# Patient Record
Sex: FEMALE | Race: WHITE | Employment: OTHER
[De-identification: names, ages, dates, MRNs, and addresses within clinical notes are randomized per-mention and may not be internally consistent; named-entity substitution may affect disease eponyms.]

---

## 2017-01-09 ENCOUNTER — TELEPHONE (OUTPATIENT)
Dept: CASE MANAGEMENT | Age: 77
End: 2017-01-09

## 2017-01-09 ENCOUNTER — OFFICE VISIT (OUTPATIENT)
Dept: FAMILY MEDICINE CLINIC | Age: 77
End: 2017-01-09

## 2017-01-09 VITALS
OXYGEN SATURATION: 97 % | HEIGHT: 67 IN | RESPIRATION RATE: 16 BRPM | BODY MASS INDEX: 24.96 KG/M2 | HEART RATE: 76 BPM | SYSTOLIC BLOOD PRESSURE: 140 MMHG | TEMPERATURE: 97.9 F | WEIGHT: 159 LBS | DIASTOLIC BLOOD PRESSURE: 84 MMHG

## 2017-01-09 DIAGNOSIS — R91.1 PULMONARY NODULE: ICD-10-CM

## 2017-01-09 DIAGNOSIS — J44.9 COPD, SEVERITY TO BE DETERMINED (HCC): Primary | ICD-10-CM

## 2017-01-09 DIAGNOSIS — I50.22 CHRONIC SYSTOLIC CONGESTIVE HEART FAILURE (HCC): ICD-10-CM

## 2017-01-09 DIAGNOSIS — I34.2 NON-RHEUMATIC MITRAL VALVE STENOSIS: ICD-10-CM

## 2017-01-09 PROCEDURE — 99214 OFFICE O/P EST MOD 30 MIN: CPT | Performed by: FAMILY MEDICINE

## 2017-01-10 ENCOUNTER — TELEPHONE (OUTPATIENT)
Dept: PULMONOLOGY | Age: 77
End: 2017-01-10

## 2017-01-16 ENCOUNTER — TELEPHONE (OUTPATIENT)
Dept: PULMONOLOGY | Age: 77
End: 2017-01-16

## 2017-01-16 DIAGNOSIS — J44.9 COPD, SEVERITY TO BE DETERMINED (HCC): Primary | ICD-10-CM

## 2017-01-18 ENCOUNTER — HOSPITAL ENCOUNTER (OUTPATIENT)
Dept: PULMONOLOGY | Age: 77
Discharge: HOME | End: 2017-01-18
Attending: INTERNAL MEDICINE | Admitting: INTERNAL MEDICINE

## 2017-01-18 DIAGNOSIS — J44.9 CHRONIC OBSTRUCTIVE PULMONARY DISEASE (HCC): ICD-10-CM

## 2017-01-18 LAB
DLCO %PRED: NORMAL
DLCO PRE: NORMAL
FEF 25-75%-POST: NORMAL
FEF 25-75%-PRE: NORMAL
FEV1-POST: NORMAL
FEV1-PRE: NORMAL
FEV1/FVC-POST: NORMAL
FEV1/FVC-PRE: NORMAL
FVC-POST: NORMAL
FVC-PRE: NORMAL
MEP: NORMAL
MIP: NORMAL
MVV %PRED-PRE: NORMAL
MVV-PRE: NORMAL
TLC %PRED: NORMAL
TLC PRE: NORMAL

## 2017-01-18 PROCEDURE — 94727 GAS DIL/WSHOT DETER LNG VOL: CPT | Performed by: INTERNAL MEDICINE

## 2017-01-18 PROCEDURE — 94060 EVALUATION OF WHEEZING: CPT | Performed by: INTERNAL MEDICINE

## 2017-01-18 RX ORDER — ALBUTEROL SULFATE 90 UG/1
4 AEROSOL, METERED RESPIRATORY (INHALATION) ONCE
Status: COMPLETED | OUTPATIENT
Start: 2017-01-18 | End: 2017-01-18

## 2017-01-18 RX ADMIN — ALBUTEROL SULFATE 4 PUFF: 90 AEROSOL, METERED RESPIRATORY (INHALATION) at 11:07

## 2017-01-19 ENCOUNTER — OFFICE VISIT (OUTPATIENT)
Dept: PULMONOLOGY | Age: 77
End: 2017-01-19

## 2017-01-19 VITALS
HEART RATE: 78 BPM | HEIGHT: 66 IN | OXYGEN SATURATION: 98 % | SYSTOLIC BLOOD PRESSURE: 158 MMHG | TEMPERATURE: 98.1 F | RESPIRATION RATE: 16 BRPM | WEIGHT: 160 LBS | DIASTOLIC BLOOD PRESSURE: 80 MMHG | BODY MASS INDEX: 25.71 KG/M2

## 2017-01-19 DIAGNOSIS — Z71.6 TOBACCO ABUSE COUNSELING: ICD-10-CM

## 2017-01-19 DIAGNOSIS — R91.1 PULMONARY NODULE: Primary | ICD-10-CM

## 2017-01-19 PROCEDURE — 99215 OFFICE O/P EST HI 40 MIN: CPT | Performed by: INTERNAL MEDICINE

## 2017-01-27 ENCOUNTER — CARE COORDINATION (OUTPATIENT)
Dept: INTERNAL MEDICINE CLINIC | Age: 77
End: 2017-01-27

## 2017-02-03 ENCOUNTER — TELEPHONE (OUTPATIENT)
Dept: FAMILY MEDICINE CLINIC | Age: 77
End: 2017-02-03

## 2017-02-03 RX ORDER — LISINOPRIL 2.5 MG/1
2.5 TABLET ORAL DAILY
Qty: 90 TABLET | Refills: 1 | Status: SHIPPED | OUTPATIENT
Start: 2017-02-03 | End: 2017-03-08 | Stop reason: SDUPTHER

## 2017-03-09 RX ORDER — LISINOPRIL 2.5 MG/1
2.5 TABLET ORAL DAILY
Qty: 90 TABLET | Refills: 1 | Status: SHIPPED | OUTPATIENT
Start: 2017-03-09 | End: 2017-08-08 | Stop reason: ALTCHOICE

## 2017-04-27 ENCOUNTER — TELEPHONE (OUTPATIENT)
Dept: OTHER | Facility: CLINIC | Age: 77
End: 2017-04-27

## 2017-05-03 ENCOUNTER — TELEPHONE (OUTPATIENT)
Dept: FAMILY MEDICINE CLINIC | Age: 77
End: 2017-05-03

## 2017-05-03 RX ORDER — CARVEDILOL 6.25 MG/1
6.25 TABLET ORAL 2 TIMES DAILY WITH MEALS
Qty: 60 TABLET | Refills: 3 | Status: SHIPPED | OUTPATIENT
Start: 2017-05-03 | End: 2017-05-17 | Stop reason: DRUGHIGH

## 2017-05-12 RX ORDER — PRAVASTATIN SODIUM 40 MG
40 TABLET ORAL DAILY
Qty: 90 TABLET | Refills: 1 | Status: SHIPPED | OUTPATIENT
Start: 2017-05-12 | End: 2018-05-12

## 2017-05-17 ENCOUNTER — OFFICE VISIT (OUTPATIENT)
Dept: FAMILY MEDICINE CLINIC | Age: 77
End: 2017-05-17

## 2017-05-17 VITALS
DIASTOLIC BLOOD PRESSURE: 80 MMHG | HEART RATE: 78 BPM | WEIGHT: 165 LBS | RESPIRATION RATE: 16 BRPM | BODY MASS INDEX: 26.52 KG/M2 | TEMPERATURE: 98.5 F | HEIGHT: 66 IN | SYSTOLIC BLOOD PRESSURE: 130 MMHG

## 2017-05-17 DIAGNOSIS — Z00.00 MEDICARE ANNUAL WELLNESS VISIT, SUBSEQUENT: Primary | ICD-10-CM

## 2017-05-17 DIAGNOSIS — F32.5 MAJOR DEPRESSION, SINGLE EPISODE, IN COMPLETE REMISSION (HCC): ICD-10-CM

## 2017-05-17 DIAGNOSIS — J44.9 CHRONIC OBSTRUCTIVE PULMONARY DISEASE, UNSPECIFIED COPD TYPE (HCC): ICD-10-CM

## 2017-05-17 DIAGNOSIS — I34.2 NON-RHEUMATIC MITRAL VALVE STENOSIS: ICD-10-CM

## 2017-05-17 DIAGNOSIS — Z72.0 TOBACCO ABUSE: ICD-10-CM

## 2017-05-17 DIAGNOSIS — E78.5 HYPERLIPIDEMIA WITH TARGET LDL LESS THAN 130: ICD-10-CM

## 2017-05-17 DIAGNOSIS — I50.22 CHRONIC SYSTOLIC CONGESTIVE HEART FAILURE (HCC): ICD-10-CM

## 2017-05-17 DIAGNOSIS — F32.5 MAJOR DEPRESSIVE DISORDER WITH SINGLE EPISODE, IN FULL REMISSION (HCC): ICD-10-CM

## 2017-05-17 PROCEDURE — G0439 PPPS, SUBSEQ VISIT: HCPCS | Performed by: FAMILY MEDICINE

## 2017-05-17 PROCEDURE — 99214 OFFICE O/P EST MOD 30 MIN: CPT | Performed by: FAMILY MEDICINE

## 2017-05-17 RX ORDER — CARVEDILOL 6.25 MG/1
6.25 TABLET ORAL NIGHTLY
Qty: 60 TABLET | Refills: 3 | Status: SHIPPED | COMMUNITY
Start: 2017-05-17 | End: 2017-12-20 | Stop reason: SDUPTHER

## 2017-05-17 ASSESSMENT — ANXIETY QUESTIONNAIRES: GAD7 TOTAL SCORE: 1

## 2017-05-17 ASSESSMENT — LIFESTYLE VARIABLES: HOW OFTEN DO YOU HAVE A DRINK CONTAINING ALCOHOL: 0

## 2017-05-17 ASSESSMENT — PATIENT HEALTH QUESTIONNAIRE - PHQ9: SUM OF ALL RESPONSES TO PHQ QUESTIONS 1-9: 2

## 2017-08-08 ENCOUNTER — OFFICE VISIT (OUTPATIENT)
Dept: PULMONOLOGY | Age: 77
End: 2017-08-08

## 2017-08-08 ENCOUNTER — HOSPITAL ENCOUNTER (OUTPATIENT)
Dept: CT IMAGING | Age: 77
Discharge: HOME | End: 2017-08-08
Admitting: INTERNAL MEDICINE

## 2017-08-08 VITALS
OXYGEN SATURATION: 95 % | WEIGHT: 166 LBS | TEMPERATURE: 98 F | DIASTOLIC BLOOD PRESSURE: 88 MMHG | BODY MASS INDEX: 26.68 KG/M2 | SYSTOLIC BLOOD PRESSURE: 160 MMHG | HEART RATE: 74 BPM | RESPIRATION RATE: 16 BRPM | HEIGHT: 66 IN

## 2017-08-08 DIAGNOSIS — R91.1 SOLITARY PULMONARY NODULE: ICD-10-CM

## 2017-08-08 DIAGNOSIS — Z71.6 TOBACCO ABUSE COUNSELING: ICD-10-CM

## 2017-08-08 DIAGNOSIS — R91.1 PULMONARY NODULE: ICD-10-CM

## 2017-08-08 DIAGNOSIS — R91.1 PULMONARY NODULE: Primary | ICD-10-CM

## 2017-08-08 PROCEDURE — 99213 OFFICE O/P EST LOW 20 MIN: CPT | Performed by: INTERNAL MEDICINE

## 2017-09-01 DIAGNOSIS — F32.5 MAJOR DEPRESSIVE DISORDER WITH SINGLE EPISODE, IN FULL REMISSION (HCC): ICD-10-CM

## 2017-09-01 RX ORDER — SERTRALINE HYDROCHLORIDE 100 MG/1
TABLET, FILM COATED ORAL
Qty: 90 TABLET | Refills: 0 | Status: SHIPPED | OUTPATIENT
Start: 2017-09-01 | End: 2017-12-09 | Stop reason: SDUPTHER

## 2017-12-09 DIAGNOSIS — F32.5 MAJOR DEPRESSIVE DISORDER WITH SINGLE EPISODE, IN FULL REMISSION (HCC): ICD-10-CM

## 2017-12-11 RX ORDER — SERTRALINE HYDROCHLORIDE 100 MG/1
TABLET, FILM COATED ORAL
Qty: 90 TABLET | Refills: 0 | Status: SHIPPED | OUTPATIENT
Start: 2017-12-11 | End: 2018-03-31 | Stop reason: SDUPTHER

## 2017-12-20 RX ORDER — CARVEDILOL 6.25 MG/1
6.25 TABLET ORAL NIGHTLY
Qty: 60 TABLET | Refills: 3 | Status: SHIPPED | OUTPATIENT
Start: 2017-12-20 | End: 2018-01-10 | Stop reason: SDUPTHER

## 2018-01-10 ENCOUNTER — OFFICE VISIT (OUTPATIENT)
Dept: FAMILY MEDICINE CLINIC | Age: 78
End: 2018-01-10

## 2018-01-10 VITALS
SYSTOLIC BLOOD PRESSURE: 138 MMHG | DIASTOLIC BLOOD PRESSURE: 80 MMHG | RESPIRATION RATE: 16 BRPM | WEIGHT: 164 LBS | HEART RATE: 80 BPM | TEMPERATURE: 98 F | BODY MASS INDEX: 26.36 KG/M2 | HEIGHT: 66 IN

## 2018-01-10 DIAGNOSIS — J44.9 CHRONIC OBSTRUCTIVE PULMONARY DISEASE, UNSPECIFIED COPD TYPE (HCC): ICD-10-CM

## 2018-01-10 DIAGNOSIS — S76.212A GROIN STRAIN, LEFT, INITIAL ENCOUNTER: ICD-10-CM

## 2018-01-10 DIAGNOSIS — I34.2 NON-RHEUMATIC MITRAL VALVE STENOSIS: ICD-10-CM

## 2018-01-10 DIAGNOSIS — I25.10 CORONARY ARTERY DISEASE INVOLVING NATIVE CORONARY ARTERY OF NATIVE HEART WITHOUT ANGINA PECTORIS: Primary | ICD-10-CM

## 2018-01-10 DIAGNOSIS — Z72.0 TOBACCO ABUSE: ICD-10-CM

## 2018-01-10 DIAGNOSIS — E78.5 HYPERLIPIDEMIA WITH TARGET LDL LESS THAN 130: ICD-10-CM

## 2018-01-10 DIAGNOSIS — S46.012A ROTATOR CUFF STRAIN, LEFT, INITIAL ENCOUNTER: ICD-10-CM

## 2018-01-10 DIAGNOSIS — I50.22 CHRONIC SYSTOLIC CONGESTIVE HEART FAILURE (HCC): ICD-10-CM

## 2018-01-10 DIAGNOSIS — F32.5 MAJOR DEPRESSIVE DISORDER WITH SINGLE EPISODE, IN FULL REMISSION (HCC): ICD-10-CM

## 2018-01-10 DIAGNOSIS — Z23 NEEDS FLU SHOT: ICD-10-CM

## 2018-01-10 PROCEDURE — 1090F PRES/ABSN URINE INCON ASSESS: CPT | Performed by: FAMILY MEDICINE

## 2018-01-10 PROCEDURE — 3023F SPIROM DOC REV: CPT | Performed by: FAMILY MEDICINE

## 2018-01-10 PROCEDURE — 99214 OFFICE O/P EST MOD 30 MIN: CPT | Performed by: FAMILY MEDICINE

## 2018-01-10 PROCEDURE — 4040F PNEUMOC VAC/ADMIN/RCVD: CPT | Performed by: FAMILY MEDICINE

## 2018-01-10 PROCEDURE — G0008 ADMIN INFLUENZA VIRUS VAC: HCPCS | Performed by: FAMILY MEDICINE

## 2018-01-10 PROCEDURE — 4004F PT TOBACCO SCREEN RCVD TLK: CPT | Performed by: FAMILY MEDICINE

## 2018-01-10 PROCEDURE — G8598 ASA/ANTIPLAT THER USED: HCPCS | Performed by: FAMILY MEDICINE

## 2018-01-10 PROCEDURE — G8926 SPIRO NO PERF OR DOC: HCPCS | Performed by: FAMILY MEDICINE

## 2018-01-10 PROCEDURE — 1123F ACP DISCUSS/DSCN MKR DOCD: CPT | Performed by: FAMILY MEDICINE

## 2018-01-10 PROCEDURE — G8417 CALC BMI ABV UP PARAM F/U: HCPCS | Performed by: FAMILY MEDICINE

## 2018-01-10 PROCEDURE — G8427 DOCREV CUR MEDS BY ELIG CLIN: HCPCS | Performed by: FAMILY MEDICINE

## 2018-01-10 PROCEDURE — 90662 IIV NO PRSV INCREASED AG IM: CPT | Performed by: FAMILY MEDICINE

## 2018-01-10 PROCEDURE — G8484 FLU IMMUNIZE NO ADMIN: HCPCS | Performed by: FAMILY MEDICINE

## 2018-01-10 PROCEDURE — G8399 PT W/DXA RESULTS DOCUMENT: HCPCS | Performed by: FAMILY MEDICINE

## 2018-01-10 RX ORDER — CARVEDILOL 6.25 MG/1
6.25 TABLET ORAL NIGHTLY
Qty: 60 TABLET | Refills: 3 | Status: SHIPPED | OUTPATIENT
Start: 2018-01-10 | End: 2018-06-15 | Stop reason: SDUPTHER

## 2018-01-10 NOTE — PROGRESS NOTES
CHART REVIEW  Health Maintenance   Topic Date Due    DTaP/Tdap/Td vaccine (1 - Tdap) 08/04/2010    Flu vaccine (1) 09/01/2017    Lipid screen  12/19/2021    Zostavax vaccine  Addressed    DEXA (modify frequency per FRAX score)  Completed    Pneumococcal low/med risk  Completed      Patient Active Problem List   Diagnosis    Hyperlipidemia with target LDL less than 130    Tobacco abuse    Vertigo, mild    Posttraumatic stress disorder    Screening mammogram, encounter for    GERD (gastroesophageal reflux disease)    Colon cancer screening    Sialadenitis- recurrent    Needs flu shot    Menopausal state    Major depressive disorder with single episode, in full remission ([facility] [address]    Pulmonary nodule 8mm, neg PET 2016, repeat CT 6/2017    Chronic obstructive pulmonary disease ([facility] [address]    History of MI (myocardial infarction)    Hiatal hernia    Non-rheumatic mitral valve stenosis- mild-mod    Chronic systolic congestive heart failure ([facility] [address]- EF 30-35% 12/2016    Coronary artery disease involving native coronary artery without angina pectoris- cath 12/16     Current Outpatient Prescriptions   Medication Sig Dispense Refill    carvedilol (COREG) 6.25 MG tablet Take 1 tablet by mouth nightly 60 tablet 3    sertraline (ZOLOFT) 100 MG tablet TAKE ONE TABLET BY MOUTH DAILY 90 tablet 0    pravastatin (PRAVACHOL) 40 MG tablet Take 1 tablet by mouth daily 90 tablet 1    aspirin EC 81 MG EC tablet Take 81 mg by mouth daily.  famotidine (PEPCID) 20 MG tablet Take 20 mg by mouth 2 times daily.  acetaminophen (TYLENOL ARTHRITIS PAIN) 650 MG CR tablet Take 650 mg by mouth every 8 hours as needed. No current facility-administered medications for this visit.        Cholesterol, Total   Date Value Ref Range Status   12/19/2016 161 0 - 199 mg/dL Final     LDL Calculated   Date Value Ref Range Status   12/19/2016 72 <100 mg/dL Final     HDL   Date Value Ref Range Status   12/19/2016 70 (H) 40 - 60 mg/dL Final   2011 53 40 - 60 mg/dl Final     Triglycerides   Date Value Ref Range Status   2016 93 0 - 150 mg/dL Final     Lab Results   Component Value Date    GLUCOSE 86 2016     Lab Results   Component Value Date     2016    K 3.9 2016    CREATININE 1.2 2016     Lab Results   Component Value Date    WBC 8.6 2016    HGB 12.2 2016    HCT 38.1 2016    MCV 94.1 2016     2016     Lab Results   Component Value Date    ALT 15 2016    AST 19 2016    ALKPHOS 61 2016    BILITOT 0.6 2016     TSH (uIU/mL)   Date Value   2015 1.49     No results found for: LABA1C  The 10-year ASCVD risk score (Steve Salas., et al., 2013) is: 30.6%    Values used to calculate the score:      Age: 68 years      Sex: Female      Is Non- : No      Diabetic: No      Tobacco smoker: Yes      Systolic Blood Pressure: 190 mmHg      Is BP treated: No      HDL Cholesterol: 70 mg/dL      Total Cholesterol: 161 mg/dL  VISIT NOTE   Subjective:   HPI CHRONIC:   Chief Complaint   Patient presents with    Check-Up     6 month     Arm Pain     Left     Patient here for follow-up of multiple chronic conditions includin. Coronary artery disease involving native coronary artery of native heart without angina pectoris    2. Needs flu shot    3. Hyperlipidemia with target LDL less than 130    4. Tobacco abuse    5. Major depressive disorder with single episode, in full remission ([address]    6. Non-rheumatic mitral valve stenosis- mild-mod    7. Chronic systolic congestive heart failure ([address]- EF 30-35% 2016    8.  Chronic obstructive pulmonary disease, unspecified COPD type ([address]      Left upper arm pain with abduction of arm x 2 months, not changing  OTC aleve some help     L inner upper thigh pain, mostly with walking, OK at rest, aleve some help, intermittent, not daily, x 2 months    Low back ache with prolonged stand, no radicular symptoms or incontinence    · Following appropriate diet? Yes  · Exercise:  intermittently  · Taking medicines daily as directed? Yes  · Any side effects of medications? No    ROS:  General ROS: negative for - chills, fatigue or fever  Hematological and Lymphatic ROS: negative for - blood clots or weight loss  Respiratory ROS: no cough, shortness of breath, or wheezing  Cardiovascular ROS: no chest pain or dyspnea on exertion  Gastrointestinal ROS: no abdominal pain, change in bowel habits, or black or bloody stools  Genito-Urinary ROS: no dysuria, trouble voiding, or hematuria  Neurological ROS: no TIA or stroke symptoms    HISTORY:  Patient's medications, allergies, past medical, surgical, social and family histories were reviewed and updated as appropriate (See above). Objective:   PHYSICAL EXAM   /80 (Site: Right Arm, Position: Sitting, Cuff Size: Medium Adult)   Pulse 80   Temp 98 °F (36.7 °C) (Oral)   Resp 16   Ht 5' 6\" (1.676 m)   Wt 164 lb (74.4 kg)   BMI 26.47 kg/m²   Blood pressure is Good. BP Readings from Last 5 Encounters:   01/10/18 138/80   08/08/17 (!) 160/88   05/17/17 130/80   01/19/17 (!) 158/80   01/09/17 140/84     Weight is decreased. Wt Readings from Last 5 Encounters:   01/10/18 164 lb (74.4 kg)   08/08/17 166 lb (75.3 kg)   05/17/17 165 lb (74.8 kg)   01/19/17 160 lb (72.6 kg)   01/09/17 159 lb (72.1 kg)      GENERAL:   · well-developed, well-nourished, alert, no distress.      EYES: glasses  · External findings: lids and lashes normal and conjunctivae and sclerae normal  LUNGS:    · Breathing unlabored  · clear to auscultation bilaterally and good air movement  · Palpation: Normal  CARDIOVASC:   · regular rate and rhythm, S1, S2 normal. No murmur, click, rub or gallop  · Apical impulse normal  · LEGS:  Lower extremity edema: none    SKIN: warm and dry  PSYCH:    · Alert and oriented  · Normal reasoning, insight good  · Facial expressions full, mood

## 2018-01-10 NOTE — PATIENT INSTRUCTIONS
your back with your knees bent and your feet flat on the floor. Tighten your stomach muscles and flatten your back against the floor. Tuck your chin to your chest. With your hands stretched out in front of you, curl your upper body forward until your shoulders clear the floor. Hold this position for 3 seconds. Don't hold your breath. It helps to breathe out as you lift your shoulders up. Relax. Repeat 10 times. Build to 3 sets of 10. To challenge yourself, clasp your hands behind your head and keep your elbows out to the side. Lower trunk rotation: Lie on your back with your knees bent and your feet flat on the floor. Tighten your abdominal muscles and push your lower back into the floor. Keeping your shoulders down flat, gently rotate your legs to one side, then the other as far as you can. Repeat 10 to 20 times. Single knee to chest stretch: Lie on your back with your legs straight out in front of you. Bring one knee up to your chest and grasp the back of your thigh. Pull your knee toward your chest, stretching your buttock muscle. Hold this position for 15 to 30 seconds and return to the starting position. Repeat 3 times on each side. Double knee to chest: Lie on your back with your knees bent and your feet flat on the floor. Tighten your abdominal muscles and push your lower back into the floor. Pull both knees up to your chest. Hold for 5 seconds and repeat 10 to 20 times. How can I take care of myself? In addition to the treatment described above, keep in mind these suggestions:   Use an electric heating pad on a low setting (or a hot water bottle wrapped in a towel to avoid burning yourself) for 20 to 30 minutes. Don't let the heating pad get too hot, and don't fall asleep with it. You could get a burn. Try putting an ice pack wrapped in a towel on your back for 20 minutes, one to four times a day. Set an alarm to avoid frostbite from using the ice pack too long.    Put a pillow under your knees in a doorway with your elbow bent 90° and the front of your hand pressing against the door frame, attempt to press your palm into the door frame. Hold for 5 seconds. Do 3 sets of 10. Tubing exercise for external rotation: Stand resting the hand of your injured side against your stomach. With that hand grasp tubing that is connected to a doorknob or other object at waist level. Keeping your elbow in at your side, rotate your arm outward and away from your waist. Make sure you keep your elbow bent 90 degrees and your forearm parallel to the floor. Repeat 10 times. Build up to 3 sets of 10. Supraspinatus exercise: Standing with your arms at your sides and your thumbs pointed toward the floor. Lift your arms up and out from your sides, keeping your elbows straight. Lift your hands only to shoulder level. Hold 5 seconds. Do 3 sets of 10. Gradually add weight to your hands to increase your strength.

## 2018-03-31 DIAGNOSIS — F32.5 MAJOR DEPRESSIVE DISORDER WITH SINGLE EPISODE, IN FULL REMISSION (HCC): ICD-10-CM

## 2018-04-02 RX ORDER — SERTRALINE HYDROCHLORIDE 100 MG/1
TABLET, FILM COATED ORAL
Qty: 90 TABLET | Refills: 1 | Status: SHIPPED | OUTPATIENT
Start: 2018-04-02 | End: 2019-03-22

## 2018-05-08 DIAGNOSIS — E78.5 HYPERLIPIDEMIA WITH TARGET LDL LESS THAN 130: ICD-10-CM

## 2018-05-08 DIAGNOSIS — I25.10 CORONARY ARTERY DISEASE INVOLVING NATIVE CORONARY ARTERY OF NATIVE HEART WITHOUT ANGINA PECTORIS: ICD-10-CM

## 2018-05-08 LAB
A/G RATIO: 1.8 (ref 1.1–2.2)
ALBUMIN SERPL-MCNC: 4.2 G/DL (ref 3.4–5)
ALP BLD-CCNC: 62 U/L (ref 40–129)
ALT SERPL-CCNC: 10 U/L (ref 10–40)
ANION GAP SERPL CALCULATED.3IONS-SCNC: 16 MMOL/L (ref 3–16)
AST SERPL-CCNC: 17 U/L (ref 15–37)
BILIRUB SERPL-MCNC: 0.4 MG/DL (ref 0–1)
BUN BLDV-MCNC: 16 MG/DL (ref 7–20)
CALCIUM SERPL-MCNC: 9 MG/DL (ref 8.3–10.6)
CHLORIDE BLD-SCNC: 103 MMOL/L (ref 99–110)
CHOLESTEROL, TOTAL: 285 MG/DL (ref 0–199)
CO2: 25 MMOL/L (ref 21–32)
CREAT SERPL-MCNC: 1.1 MG/DL (ref 0.6–1.2)
GFR AFRICAN AMERICAN: 58
GFR NON-AFRICAN AMERICAN: 48
GLOBULIN: 2.3 G/DL
GLUCOSE BLD-MCNC: 91 MG/DL (ref 70–99)
HDLC SERPL-MCNC: 58 MG/DL (ref 40–60)
LDL CHOLESTEROL CALCULATED: 193 MG/DL
POTASSIUM SERPL-SCNC: 4.8 MMOL/L (ref 3.5–5.1)
SODIUM BLD-SCNC: 144 MMOL/L (ref 136–145)
TOTAL PROTEIN: 6.5 G/DL (ref 6.4–8.2)
TRIGL SERPL-MCNC: 170 MG/DL (ref 0–150)
VLDLC SERPL CALC-MCNC: 34 MG/DL

## 2018-05-12 RX ORDER — ATORVASTATIN CALCIUM 80 MG/1
80 TABLET, FILM COATED ORAL NIGHTLY
Qty: 30 TABLET | Refills: 5 | Status: SHIPPED | OUTPATIENT
Start: 2018-05-12 | End: 2018-09-05

## 2018-05-15 ENCOUNTER — HOSPITAL ENCOUNTER (OUTPATIENT)
Dept: OTHER | Age: 78
Discharge: HOME | End: 2018-05-15
Attending: FAMILY MEDICINE | Admitting: FAMILY MEDICINE

## 2018-05-15 ENCOUNTER — OFFICE VISIT (OUTPATIENT)
Dept: FAMILY MEDICINE CLINIC | Age: 78
End: 2018-05-15

## 2018-05-15 VITALS
HEIGHT: 66 IN | HEART RATE: 80 BPM | BODY MASS INDEX: 26.9 KG/M2 | RESPIRATION RATE: 16 BRPM | WEIGHT: 167.4 LBS | TEMPERATURE: 97.6 F | SYSTOLIC BLOOD PRESSURE: 142 MMHG | OXYGEN SATURATION: 98 % | DIASTOLIC BLOOD PRESSURE: 86 MMHG

## 2018-05-15 DIAGNOSIS — G89.29 CHRONIC LEFT-SIDED LOW BACK PAIN WITH LEFT-SIDED SCIATICA: Primary | ICD-10-CM

## 2018-05-15 DIAGNOSIS — G89.29 CHRONIC LEFT-SIDED LOW BACK PAIN WITH LEFT-SIDED SCIATICA: ICD-10-CM

## 2018-05-15 DIAGNOSIS — M54.42 CHRONIC LEFT-SIDED LOW BACK PAIN WITH LEFT-SIDED SCIATICA: ICD-10-CM

## 2018-05-15 DIAGNOSIS — M71.22 POPLITEAL CYST, LEFT: ICD-10-CM

## 2018-05-15 DIAGNOSIS — R10.32 LEFT GROIN PAIN: ICD-10-CM

## 2018-05-15 DIAGNOSIS — M54.42 CHRONIC LEFT-SIDED LOW BACK PAIN WITH LEFT-SIDED SCIATICA: Primary | ICD-10-CM

## 2018-05-15 DIAGNOSIS — Z91.81 AT HIGH RISK FOR FALLS: ICD-10-CM

## 2018-05-15 PROCEDURE — G8417 CALC BMI ABV UP PARAM F/U: HCPCS | Performed by: FAMILY MEDICINE

## 2018-05-15 PROCEDURE — 4040F PNEUMOC VAC/ADMIN/RCVD: CPT | Performed by: FAMILY MEDICINE

## 2018-05-15 PROCEDURE — 1123F ACP DISCUSS/DSCN MKR DOCD: CPT | Performed by: FAMILY MEDICINE

## 2018-05-15 PROCEDURE — 99213 OFFICE O/P EST LOW 20 MIN: CPT | Performed by: FAMILY MEDICINE

## 2018-05-15 PROCEDURE — 1090F PRES/ABSN URINE INCON ASSESS: CPT | Performed by: FAMILY MEDICINE

## 2018-05-15 PROCEDURE — 4004F PT TOBACCO SCREEN RCVD TLK: CPT | Performed by: FAMILY MEDICINE

## 2018-05-15 PROCEDURE — G8399 PT W/DXA RESULTS DOCUMENT: HCPCS | Performed by: FAMILY MEDICINE

## 2018-05-15 PROCEDURE — G8598 ASA/ANTIPLAT THER USED: HCPCS | Performed by: FAMILY MEDICINE

## 2018-05-15 PROCEDURE — G8427 DOCREV CUR MEDS BY ELIG CLIN: HCPCS | Performed by: FAMILY MEDICINE

## 2018-05-15 RX ORDER — PREDNISONE 10 MG/1
TABLET ORAL
Qty: 39 TABLET | Refills: 0 | Status: SHIPPED | OUTPATIENT
Start: 2018-05-15 | End: 2018-05-27

## 2018-05-16 ENCOUNTER — TELEPHONE (OUTPATIENT)
Dept: FAMILY MEDICINE CLINIC | Age: 78
End: 2018-05-16

## 2018-05-17 ENCOUNTER — TELEPHONE (OUTPATIENT)
Dept: FAMILY MEDICINE CLINIC | Age: 78
End: 2018-05-17

## 2018-06-15 ENCOUNTER — OFFICE VISIT (OUTPATIENT)
Dept: FAMILY MEDICINE CLINIC | Age: 78
End: 2018-06-15

## 2018-06-15 VITALS
HEIGHT: 66 IN | BODY MASS INDEX: 27.32 KG/M2 | SYSTOLIC BLOOD PRESSURE: 170 MMHG | RESPIRATION RATE: 14 BRPM | WEIGHT: 170 LBS | OXYGEN SATURATION: 97 % | DIASTOLIC BLOOD PRESSURE: 94 MMHG | HEART RATE: 86 BPM

## 2018-06-15 DIAGNOSIS — I50.22 CHRONIC SYSTOLIC CONGESTIVE HEART FAILURE (HCC): ICD-10-CM

## 2018-06-15 DIAGNOSIS — Z72.0 TOBACCO ABUSE: ICD-10-CM

## 2018-06-15 DIAGNOSIS — I10 ESSENTIAL HYPERTENSION: Primary | ICD-10-CM

## 2018-06-15 PROCEDURE — 4004F PT TOBACCO SCREEN RCVD TLK: CPT | Performed by: FAMILY MEDICINE

## 2018-06-15 PROCEDURE — G8417 CALC BMI ABV UP PARAM F/U: HCPCS | Performed by: FAMILY MEDICINE

## 2018-06-15 PROCEDURE — G8399 PT W/DXA RESULTS DOCUMENT: HCPCS | Performed by: FAMILY MEDICINE

## 2018-06-15 PROCEDURE — G8598 ASA/ANTIPLAT THER USED: HCPCS | Performed by: FAMILY MEDICINE

## 2018-06-15 PROCEDURE — 1090F PRES/ABSN URINE INCON ASSESS: CPT | Performed by: FAMILY MEDICINE

## 2018-06-15 PROCEDURE — 4040F PNEUMOC VAC/ADMIN/RCVD: CPT | Performed by: FAMILY MEDICINE

## 2018-06-15 PROCEDURE — 3288F FALL RISK ASSESSMENT DOCD: CPT | Performed by: FAMILY MEDICINE

## 2018-06-15 PROCEDURE — 1123F ACP DISCUSS/DSCN MKR DOCD: CPT | Performed by: FAMILY MEDICINE

## 2018-06-15 PROCEDURE — 99214 OFFICE O/P EST MOD 30 MIN: CPT | Performed by: FAMILY MEDICINE

## 2018-06-15 PROCEDURE — G8427 DOCREV CUR MEDS BY ELIG CLIN: HCPCS | Performed by: FAMILY MEDICINE

## 2018-06-15 PROCEDURE — G8510 SCR DEP NEG, NO PLAN REQD: HCPCS | Performed by: FAMILY MEDICINE

## 2018-06-15 RX ORDER — COVID-19 ANTIGEN TEST
KIT MISCELLANEOUS
COMMUNITY
End: 2020-09-29

## 2018-06-15 RX ORDER — CARVEDILOL 6.25 MG/1
6.25 TABLET ORAL 2 TIMES DAILY
Qty: 60 TABLET | Refills: 3 | Status: SHIPPED | OUTPATIENT
Start: 2018-06-15 | End: 2018-06-29 | Stop reason: SDUPTHER

## 2018-06-15 ASSESSMENT — PATIENT HEALTH QUESTIONNAIRE - PHQ9
SUM OF ALL RESPONSES TO PHQ QUESTIONS 1-9: 0
SUM OF ALL RESPONSES TO PHQ9 QUESTIONS 1 & 2: 0
1. LITTLE INTEREST OR PLEASURE IN DOING THINGS: 0
2. FEELING DOWN, DEPRESSED OR HOPELESS: 0

## 2018-06-29 DIAGNOSIS — I50.22 CHRONIC SYSTOLIC CONGESTIVE HEART FAILURE (HCC): ICD-10-CM

## 2018-06-29 DIAGNOSIS — I10 ESSENTIAL HYPERTENSION: ICD-10-CM

## 2018-06-29 RX ORDER — CARVEDILOL 6.25 MG/1
TABLET ORAL
Qty: 180 TABLET | Refills: 2 | Status: SHIPPED | OUTPATIENT
Start: 2018-06-29 | End: 2018-09-05 | Stop reason: SDUPTHER

## 2018-08-27 ENCOUNTER — TELEPHONE (OUTPATIENT)
Dept: PULMONOLOGY | Age: 78
End: 2018-08-27

## 2018-08-27 DIAGNOSIS — R91.1 PULMONARY NODULE SEEN ON IMAGING STUDY: Primary | ICD-10-CM

## 2018-08-27 NOTE — TELEPHONE ENCOUNTER
Patient said that she is calling regarding her yearly orders that she usually does the same day as appointment. She is currently scheduled 10/25/18.   I checked the last office note and only mentions CT of chest.

## 2018-09-05 ENCOUNTER — OFFICE VISIT (OUTPATIENT)
Dept: FAMILY MEDICINE CLINIC | Age: 78
End: 2018-09-05

## 2018-09-05 VITALS
HEART RATE: 72 BPM | BODY MASS INDEX: 26.84 KG/M2 | WEIGHT: 167 LBS | RESPIRATION RATE: 16 BRPM | HEIGHT: 66 IN | SYSTOLIC BLOOD PRESSURE: 138 MMHG | OXYGEN SATURATION: 99 % | TEMPERATURE: 97.3 F | DIASTOLIC BLOOD PRESSURE: 80 MMHG

## 2018-09-05 DIAGNOSIS — Z23 NEED FOR VACCINATION FOR ZOSTER: ICD-10-CM

## 2018-09-05 DIAGNOSIS — R42 VERTIGO: ICD-10-CM

## 2018-09-05 DIAGNOSIS — Z00.00 MEDICARE ANNUAL WELLNESS VISIT, SUBSEQUENT: Primary | ICD-10-CM

## 2018-09-05 DIAGNOSIS — R19.7 DIARRHEA, UNSPECIFIED TYPE: ICD-10-CM

## 2018-09-05 DIAGNOSIS — J44.9 CHRONIC OBSTRUCTIVE PULMONARY DISEASE, UNSPECIFIED COPD TYPE (HCC): ICD-10-CM

## 2018-09-05 DIAGNOSIS — E78.5 HYPERLIPIDEMIA WITH TARGET LDL LESS THAN 100: ICD-10-CM

## 2018-09-05 DIAGNOSIS — I50.22 CHRONIC SYSTOLIC CONGESTIVE HEART FAILURE (HCC): ICD-10-CM

## 2018-09-05 DIAGNOSIS — F32.5 MAJOR DEPRESSIVE DISORDER WITH SINGLE EPISODE, IN FULL REMISSION (HCC): ICD-10-CM

## 2018-09-05 DIAGNOSIS — Z72.0 TOBACCO ABUSE: ICD-10-CM

## 2018-09-05 DIAGNOSIS — R07.9 CHEST PAIN, UNSPECIFIED TYPE: ICD-10-CM

## 2018-09-05 DIAGNOSIS — I25.10 CORONARY ARTERY DISEASE INVOLVING NATIVE CORONARY ARTERY OF NATIVE HEART WITHOUT ANGINA PECTORIS: ICD-10-CM

## 2018-09-05 DIAGNOSIS — I10 ESSENTIAL HYPERTENSION: ICD-10-CM

## 2018-09-05 PROCEDURE — 93000 ELECTROCARDIOGRAM COMPLETE: CPT | Performed by: FAMILY MEDICINE

## 2018-09-05 PROCEDURE — 4040F PNEUMOC VAC/ADMIN/RCVD: CPT | Performed by: FAMILY MEDICINE

## 2018-09-05 PROCEDURE — 99214 OFFICE O/P EST MOD 30 MIN: CPT | Performed by: FAMILY MEDICINE

## 2018-09-05 PROCEDURE — 1090F PRES/ABSN URINE INCON ASSESS: CPT | Performed by: FAMILY MEDICINE

## 2018-09-05 PROCEDURE — G8598 ASA/ANTIPLAT THER USED: HCPCS | Performed by: FAMILY MEDICINE

## 2018-09-05 PROCEDURE — 1101F PT FALLS ASSESS-DOCD LE1/YR: CPT | Performed by: FAMILY MEDICINE

## 2018-09-05 PROCEDURE — 3023F SPIROM DOC REV: CPT | Performed by: FAMILY MEDICINE

## 2018-09-05 PROCEDURE — G0439 PPPS, SUBSEQ VISIT: HCPCS | Performed by: FAMILY MEDICINE

## 2018-09-05 PROCEDURE — G8399 PT W/DXA RESULTS DOCUMENT: HCPCS | Performed by: FAMILY MEDICINE

## 2018-09-05 PROCEDURE — 4004F PT TOBACCO SCREEN RCVD TLK: CPT | Performed by: FAMILY MEDICINE

## 2018-09-05 PROCEDURE — G8926 SPIRO NO PERF OR DOC: HCPCS | Performed by: FAMILY MEDICINE

## 2018-09-05 PROCEDURE — G8427 DOCREV CUR MEDS BY ELIG CLIN: HCPCS | Performed by: FAMILY MEDICINE

## 2018-09-05 PROCEDURE — 1123F ACP DISCUSS/DSCN MKR DOCD: CPT | Performed by: FAMILY MEDICINE

## 2018-09-05 PROCEDURE — G8417 CALC BMI ABV UP PARAM F/U: HCPCS | Performed by: FAMILY MEDICINE

## 2018-09-05 RX ORDER — PRAVASTATIN SODIUM 80 MG/1
80 TABLET ORAL DAILY
Qty: 30 TABLET | Refills: 3 | Status: SHIPPED | OUTPATIENT
Start: 2018-09-05 | End: 2019-03-22 | Stop reason: SDUPTHER

## 2018-09-05 RX ORDER — MECLIZINE HYDROCHLORIDE 25 MG/1
25 TABLET ORAL 3 TIMES DAILY PRN
Qty: 30 TABLET | Refills: 1 | Status: SHIPPED | OUTPATIENT
Start: 2018-09-05 | End: 2019-09-05

## 2018-09-05 RX ORDER — CARVEDILOL 12.5 MG/1
12.5 TABLET ORAL 2 TIMES DAILY WITH MEALS
Qty: 60 TABLET | Refills: 3 | Status: SHIPPED | OUTPATIENT
Start: 2018-09-05 | End: 2019-01-05 | Stop reason: SDUPTHER

## 2018-09-05 ASSESSMENT — ANXIETY QUESTIONNAIRES: GAD7 TOTAL SCORE: 2

## 2018-09-05 ASSESSMENT — PATIENT HEALTH QUESTIONNAIRE - PHQ9
SUM OF ALL RESPONSES TO PHQ QUESTIONS 1-9: 2
SUM OF ALL RESPONSES TO PHQ QUESTIONS 1-9: 2

## 2018-09-05 NOTE — PATIENT INSTRUCTIONS
drive you to the nearest hospital. Drive yourself only as a last resort. If you have risk factors for coronary artery disease  such as high blood pressure, high cholesterol, diabetes or obesity  talk to your doctor. He or she may want to test you for the condition, especially if you have signs or symptoms of narrowed arteries. Even if you don't have evidence of coronary artery disease, your doctor may recommend aggressive treatment of your risk factors. Early diagnosis and treatment may stop progression of coronary artery disease and help prevent a heart attack. Coronary artery disease is thought to begin with damage or injury to the inner layer of a coronary artery, sometimes as early as childhood. The damage may be caused by various factors, including:  Smoking   High blood pressure   High cholesterol   Diabetes   Radiation therapy to the chest, as used for certain types of cancer  Once the inner wall of an artery is damaged, fatty deposits (plaques) made of cholesterol and other cellular waste products tend to accumulate at the site of injury in a process called atherosclerosis. If the surface of these plaques breaks or ruptures, blood cells called platelets will clump at the site to try to repair the artery. This clump can block the artery, leading to a heart attack. Risk factors for coronary artery disease include:  Age. Simply getting older increases your risk of damaged and narrowed arteries. Sex. Men are generally at greater risk of coronary artery disease. However, the risk for women increases after menopause. Family history. A family history of heart disease is associated with a higher risk of coronary artery disease, especially if a close relative developed heart disease at an early age. Your risk is highest if your father or a brother was diagnosed with heart disease before age 54, or your mother or a sister developed it before age 72. Smoking.  Nicotine constricts your blood vessels, and quickly you receive treatment. Heart failure. If some areas of your heart are chronically deprived of oxygen and nutrients because of reduced blood flow, or if your heart has been damaged by a heart attack, your heart may become too weak to pump enough blood to meet your body's needs. This condition is known as heart failure. Abnormal heart rhythm (arrhythmia). Inadequate blood supply to the heart or damage to heart tissue can interfere with your heart's electrical impulses, causing abnormal heart rhythms. Early-stage coronary artery disease often produces no symptoms, so you may not discover you're at risk of the condition until a routine checkup reveals you have high cholesterol or high blood pressure. So it's important to have regular checkups. If you're seeing your doctor because you're having symptoms or you have risk factors for coronary artery disease, you're likely to start by first seeing your primary care doctor or a general practitioner. Eventually, however, you may be referred to a heart specialist (cardiologist). Because appointments can be brief, and because there's often a lot of ground to cover, it's a good idea to be prepared for your appointment. Here's some information to help you get ready for your appointment, and what to expect from your doctor. What you can do  Be aware of any pre-appointment restrictions. At the time you make the appointment, be sure to ask if there's anything you need to do in advance, such as restrict your diet. For a cholesterol test, for example, you may need to fast for a period of time beforehand. Write down any symptoms you're experiencing, including any that may seem unrelated to coronary artery disease. Write down your key medical information, including other conditions with which you've been diagnosed, all medications and supplements you're taking, and any family history of heart disease.    Find a family member or friend who can come with you to the appointment, if possible. Someone who accompanies you can help remember what the doctor says. Write down questions to ask your doctor. Questions to ask your doctor at your initial appointment include:   What are the possible causes for my signs and symptoms? What tests do I need? Should I see a specialist?   Should I follow any restrictions while I wait for my next appointment? What emergency signs and symptoms should prompt a call to 911 or emergency medical help? Questions to ask if you are referred to a cardiologist include:   What is my diagnosis? What is my risk of long-term complications from this condition? What treatment do you recommend? If you're recommending medications, what are the possible side effects? Am I a candidate for surgery? Why or why not? What diet and lifestyle changes should I make? What restrictions do I need to follow, if any? How frequently will you see me for follow-up visits? I have these other health problems. How can I best manage them together? In addition to the questions that you've prepared to ask your doctor, don't hesitate to ask questions during your appointment. What to expect from your doctor  A doctor or cardiologist who sees you for heart-related signs and symptoms may ask:  What are your symptoms? When did you first begin experiencing symptoms? Have your symptoms gotten worse over time? Do your symptoms include chest pain? Have you had any difficulty breathing? Does exercise or physical exertion make your symptoms worse? Are you aware of any history of heart problems in your family? Have you been diagnosed with any other health conditions? What medications are you currently taking? Have you ever been treated with radiation therapy? How much do you exercise in a typical week? What's your typical daily diet? Do you or did you smoke? How much? Do you drink alcohol? How much?   What you can do in the meantime  It's never too early to make healthy lifestyle changes, such as quitting smoking, eating healthy foods and becoming more physically active. These are primary lines of defense against coronary artery disease and its complications, including heart attack and stroke. The doctor will ask questions about your medical history, do a physical exam and order routine blood tests. He or she may suggest one or more diagnostic tests as well, including:  Electrocardiogram (ECG). An electrocardiogram records electrical signals as they travel through your heart. An ECG can often reveal evidence of a previous heart attack or one that's in progress. In other cases, Holter monitoring may be recommended. With this type of ECG, you wear a portable monitor for 24 hours as you go about your normal activities. Certain abnormalities may indicate inadequate blood flow to your heart. Echocardiogram. An echocardiogram uses sound waves to produce images of your heart. During an echocardiogram, your doctor can determine whether all parts of the heart wall are contributing normally to your heart's pumping activity. Parts that move weakly may have been damaged during a heart attack or be receiving too little oxygen. This may indicate coronary artery disease or various other conditions. Stress test. If your signs and symptoms occur most often during exercise, your doctor may ask you to walk on a treadmill or ride a stationary bike during an ECG. This is known as an exercise stress test. In some cases, medication to stimulate your heart may be used instead of exercise. Some stress tests are done using an echocardiogram. For example, your doctor may do an ultrasound before and after you exercise on a treadmill or bike. Or your doctor may use medication to stimulate your heart during an echocardiogram.   Another stress test known as a nuclear stress test helps measure blood flow to your heart muscle at rest and during stress.  It's similar to a routine heart health isn't as strong. Other supplements may help reduce your blood pressure or cholesterol level, two other contributing factors to coronary artery disease. These include:   Alpha-linolenic acid (ALA)   Artichoke   Barley   Beta-sitosterol (found in oral supplements and some margarines, such as Promise Activ)   Blond psyllium   Cocoa   Coenzyme N25   Garlic   Oat bran (found in oatmeal and whole oats)   Sitostanol (found in oral supplements and some margarines, such as Benecol)  Lifestyle changes can help you prevent or slow the progression of coronary artery disease. Stop smoking. Smoking is a major risk factor for coronary artery disease. Nicotine constricts blood vessels and forces your heart to work harder, and carbon monoxide reduces oxygen in your blood and damages the lining of your blood vessels. If you smoke, quitting is one of the best ways to reduce your risk of a heart attack. Control your blood pressure. Ask your doctor for a blood pressure measurement at least every two years. He or she may recommend more frequent measurements if your blood pressure is higher than normal or you have a history of heart disease. The ideal blood pressure is below 830 systolic and 80 diastolic, as measured in millimeters of mercury (mm Hg). Check your cholesterol. Ask your doctor for a baseline cholesterol test when you're in your 20s and then at least every five years. If your test results aren't within desirable ranges, your doctor may recommend more frequent measurements. Most people should aim for an LDL level below 130 milligrams per deciliter (mg/dL), or 3.4 millimoles per liter (mmol/L). If you have other risk factors for heart disease, your target LDL may be below 100 mg/dL (2.6 mmol/L). Keep diabetes under control. If you have diabetes, tight blood sugar control can help reduce the risk of heart disease. Get moving.  Exercise helps you achieve and maintain a healthy weight and control diabetes, maneuvers as described above can be done at home (Kita et al, 1999; Aye Davis and Betty, 2004). We often recommend the home-Epley to our patients who have a clear diagnosis. This procedure seems to be even more effective than the in-office procedure, perhaps because it is repeated every night for a week. The method (for the left side) is performed as shown on the figure to the right. One stays in each of the supine (lying down) positions for 30 seconds, and in the sitting upright position (top) for 1 minute. Thus, once cycle takes 2 1/2 minutes. Typically 3 cycles are performed just prior to going to sleep. It is best to do them at night rather than in the morning or midday, as if one becomes dizzy following the exercises, then it can resolve while one is sleeping. The mirror image of this procedure is used for the right ear. Personalized Preventive Plan for Sarah Luna - 9/5/2018  Medicare offers a range of preventive health benefits. Some of the tests and screenings are paid in full while other may be subject to a deductible, co-insurance, and/or copay. Some of these benefits include a comprehensive review of your medical history including lifestyle, illnesses that may run in your family, and various assessments and screenings as appropriate. After reviewing your medical record and screening and assessments performed today your provider may have ordered immunizations, labs, imaging, and/or referrals for you. A list of these orders (if applicable) as well as your Preventive Care list are included within your After Visit Summary for your review. Other Preventive Recommendations:    · A preventive eye exam performed by an eye specialist is recommended every 1-2 years to screen for glaucoma; cataracts, macular degeneration, and other eye disorders. · A preventive dental visit is recommended every 6 months.   · Try to get at least 150 minutes of exercise per week or 10,000 steps per day on a good.          FALLS:  HOW TO LOWER YOUR RISK     Who is at high risk of falling? Anyone can fall, although the risk is higher in older people. This increased risk of falling may be the result of changes that come with aging, and certain medical conditions, such as arthritis, cataracts or hip problems. What can I do to lower my risk of falling? Most falls happen in the home. Consider the following tips to make your home safe: Make sure that you have good lighting in your home. A well lit home will help you avoid tripping over objects that are not easy to see. Put night lights in your bedroom, hallways, stairs and bathrooms. Rugs should be firmly fastened to the floor or have nonskid backing. Loose ends should be tacked down. Electrical cords should not be lying on the floor in walking areas. Put hand rails in your bathroom for bath, shower and toilet use. Have rails on both sides of your stairs for support. In the kitchen, make sure items are within easy reach. Don't store things too high or too low. Then you won't have to use a stepladder or a stool to reach them. It's also a good idea to avoid storing things too low, so you won't have to bend down to get them. Wear shoes with firm nonskid soles. Avoid wearing loose-fitting slippers that could cause you to trip. What else can I do? Take good care of your body. Try to stay healthy by following these tips:  See your eye doctor once a year. Cataracts and other eye diseases that cause you not to see well, can lead to falls. Get regular physical activity to keep your bones and muscles strong. Take good care of your feet. If you have pain in your feet or if you have large, thick nails and corns, have your doctor look at your feet. Talk to your doctor about any side effects you may have from your medicines. Problems caused by side effects from medicine are a common cause of falls. The more medicines you take, the greater your risk of falling. Do the oven and fridge open easily? Are stove controls clearly marked and easy to use? Is the counter the right height and depth? Can you work sitting down? Are cabinet doorknobs easy to use? Are faucets easy to use? Do you have a hand-held shower head? Are the items you use often on high shelves? Do you have a step stool with handles? Can you easily get in and out of the tub or shower? Do you have a bath or shower seat? Are there grab bars where needed? Is the hot water heater regulated to prevent scalding or burning? Lighting/Ventilation YesNo    Are there enough lights, and are they bright enough? Do you have night lights where needed? Is area well ventilated? Electrical Outlets/Switches/Alarms YesNo    Can you turn switches easily on and off? Are outlets properly grounded to prevent a shock? Are extension cords in good shape? Do you have smoke detectors in all key areas? Do you have an alarm system? Is the telephone readily available for emergencies? Does the telephone have volume control? Can you hear the doorbell ring all throughout the house?

## 2018-09-05 NOTE — PROGRESS NOTES
Medicare Annual Wellness Visit  Name: Antonieta Leyden  YOB: 1940  Age: 68 y.o. Sex: female  MRN: [id number]     Date of Service:  9/5/2018    Scribe documentation: Anh Chua am scribing for Mckenzie Key MD. Electronically signed by Mckenzie Key  on 9/5/2018 at 10:14 AM.  MD Attestation: Corinne Anon,  personally performed the services described in this documentation, as scribed by the user listed above, and it is both accurate and complete. I agree with the Chief Complaint, ROS, and Past Histories independently gathered by the clinical support staff.       LAST LABS   Cholesterol, Total   Date Value Ref Range Status   05/08/2018 285 (H) 0 - 199 mg/dL Final     LDL Calculated   Date Value Ref Range Status   05/08/2018 193 (H) <100 mg/dL Final     HDL   Date Value Ref Range Status   05/08/2018 58 40 - 60 mg/dL Final   09/22/2011 53 40 - 60 mg/dl Final     Triglycerides   Date Value Ref Range Status   05/08/2018 170 (H) 0 - 150 mg/dL Final     Lab Results   Component Value Date    GLUCOSE 91 05/08/2018     Lab Results   Component Value Date     05/08/2018    K 4.8 05/08/2018    CREATININE 1.1 05/08/2018     Lab Results   Component Value Date    ALT 10 05/08/2018    AST 17 05/08/2018    ALKPHOS 62 05/08/2018    BILITOT 0.4 05/08/2018      Lab Results   Component Value Date    WBC 8.6 12/20/2016    HGB 12.2 12/20/2016    HCT 38.1 12/20/2016    MCV 94.1 12/20/2016     12/20/2016     TSH (uIU/mL)   Date Value   07/22/2015 1.49     No results found for: LABA1C  CHART REVIEW  Patient Active Problem List   Diagnosis    Hyperlipidemia with target LDL less than 100    Tobacco abuse    Vertigo, mild    Posttraumatic stress disorder    Screening mammogram, encounter for    GERD (gastroesophageal reflux disease)    Colon cancer screening    Sialadenitis- recurrent    Needs flu shot    Menopausal state    Major depressive disorder with single episode, in full remission ([facility] [address]    Pulmonary nodule 8mm, neg PET 2016, repeat CT per pulmonary    Chronic obstructive pulmonary disease ([facility] [address]    History of MI (myocardial infarction)    Hiatal hernia    Non-rheumatic mitral valve stenosis- mild-mod    Chronic systolic congestive heart failure ([facility] [address]- EF 30-35% 12/2016    Coronary artery disease involving native coronary artery without angina pectoris- cath 12/16    Essential hypertension     Health Maintenance   Topic Date Due    Shingles Vaccine (1 of 2 - 2 Dose Series) 12/16/1990    DTaP/Tdap/Td vaccine (1 - Tdap) 08/04/2010    Flu vaccine (1) 09/01/2018    Potassium monitoring  05/08/2019    Creatinine monitoring  05/08/2019    DEXA (modify frequency per FRAX score)  Completed    Pneumococcal low/med risk  Completed     Social History   Substance Use Topics    Smoking status: Current Every Day Smoker     Packs/day: 0.25     Years: 25.00     Types: Cigarettes    Smokeless tobacco: Never Used      Comment: advised to quit, reducing some    Alcohol use No      Comment: recovering alcoholic- sober since 74/8131     The 10-year ASCVD risk score (Robyn Vora et al., 2013) is: 38.8%    Values used to calculate the score:      Age: 68 years      Sex: Female      Is Non- : No      Diabetic: No      Tobacco smoker: Yes      Systolic Blood Pressure: 925 mmHg      Is BP treated: Yes      HDL Cholesterol: 58 mg/dL      Total Cholesterol: 285 mg/dL  Prior to Visit Medications    Medication Sig Taking?  Authorizing Provider   zoster recombinant adjuvanted vaccine (SHINGRIX) 50 MCG SUSR injection Inject 0.5 mLs into the muscle once for 1 dose Yes Jamison Lombardi MD   meclizine (ANTIVERT) 25 MG tablet Take 1 tablet by mouth 3 times daily as needed for Dizziness or Nausea Yes Jamison Lombardi MD   pravastatin (PRAVACHOL) 80 MG tablet Take 1 tablet by mouth daily Yes Jamison Lombardi MD   carvedilol (COREG) 12.5 MG tablet Take 1 tablet by mouth 2 times daily (with meals) Yes Reford Arm Adrian Clark MD   Naproxen Sodium (ALEVE) 220 MG CAPS Take by mouth Yes Historical Provider, MD   sertraline (ZOLOFT) 100 MG tablet TAKE ONE TABLET BY MOUTH DAILY Yes Juanita Rebollar MD   aspirin EC 81 MG EC tablet Take 81 mg by mouth daily. Yes Historical Provider, MD   famotidine (PEPCID) 20 MG tablet Take 20 mg by mouth 2 times daily. Yes Historical Provider, MD   acetaminophen (TYLENOL ARTHRITIS PAIN) 650 MG CR tablet Take 650 mg by mouth every 8 hours as needed. Yes Historical Provider, MD      Chief Complaint:   Jessi Azar is a 68 y.o. female who presents for Medicare Annual Wellness Visit and check-up for:  1. Medicare annual wellness visit, subsequent    2. Need for vaccination for zoster    3. Major depressive disorder with single episode, in full remission ([facility] [address] - fair control   4. Chronic obstructive pulmonary disease, unspecified COPD type ([facility] [address] good   5. Chronic systolic congestive heart failure ([facility] [address]- EF 30-35% 12/2016 - ? Synmptomatic, maris got ECHO ordered last year   6. Hyperlipidemia with target LDL less than 100 - intol of last statin   7. Tobacco abuse    8. Coronary artery disease involving native coronary artery of native heart without angina pectoris    9. Chest pain, unspecified type - NEW   10. Vertigo, mild - worsening     HPI  Complaints:   · Stopped taking atorvastatin 80mg  about a month ago due to making her feeling sick even with cutting pill on half. · Dizziness over the last year but the past few months is getting worse . Lasts a few seconds. Triggered by turning head or lay down and look up. .  · Diarrhea off and on over the past few months, every morning, multiple, no blood, watery, some incontinence, pos urgency, x few months. No recent travels, med or food changes. · Chest pain left chest pressure into left shoulder 3 weeks ago, at rest, no exacerbating/no relieving, last 10 min to several hours, daily. Cath 2016 60% LAD.   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 We will call you if any problems. If bloodwork good, you will get letter in mail or notified thru [address] (if signed up) within 2 weeks. If you do not, please call office. · Please get flu vaccine when available in fall. Can get either at this office or at stores such as [facility] and [facility]. · Get CT lung as planned  · Quit smoking. · Get heart stress test soon. · Schedule to see cardiology. · INCREASE carvediol to 12.5 mg twice a day   · Continue aspirin. · Do vertigo exercises. Take meclizine as needed. · Medicare part D patients:  Please take Rx for Shingrix to pharmacy (such as [facility] or "[employer] to get shingles vaccine. Need second dose in 2-6 months. · START Pravastatin one a day for cholesterol  · Get stool studies done. Will need colonoscopy after cardiac clearance.

## 2018-09-13 DIAGNOSIS — R07.9 CHEST PAIN, UNSPECIFIED TYPE: ICD-10-CM

## 2018-09-13 DIAGNOSIS — I50.22 CHRONIC SYSTOLIC CONGESTIVE HEART FAILURE (HCC): ICD-10-CM

## 2018-09-13 DIAGNOSIS — R19.7 DIARRHEA, UNSPECIFIED TYPE: ICD-10-CM

## 2018-09-13 DIAGNOSIS — E78.5 HYPERLIPIDEMIA WITH TARGET LDL LESS THAN 100: ICD-10-CM

## 2018-09-13 LAB
A/G RATIO: 2.4 (ref 1.1–2.2)
ALBUMIN SERPL-MCNC: 4.4 G/DL (ref 3.4–5)
ALP BLD-CCNC: 61 U/L (ref 40–129)
ALT SERPL-CCNC: 8 U/L (ref 10–40)
ANION GAP SERPL CALCULATED.3IONS-SCNC: 13 MMOL/L (ref 3–16)
AST SERPL-CCNC: 17 U/L (ref 15–37)
BASOPHILS ABSOLUTE: 0.1 K/UL (ref 0–0.2)
BASOPHILS RELATIVE PERCENT: 1 %
BILIRUB SERPL-MCNC: 0.4 MG/DL (ref 0–1)
BUN BLDV-MCNC: 15 MG/DL (ref 7–20)
CALCIUM SERPL-MCNC: 9.1 MG/DL (ref 8.3–10.6)
CHLORIDE BLD-SCNC: 104 MMOL/L (ref 99–110)
CHOLESTEROL, TOTAL: 264 MG/DL (ref 0–199)
CO2: 24 MMOL/L (ref 21–32)
CREAT SERPL-MCNC: 1.1 MG/DL (ref 0.6–1.2)
EOSINOPHILS ABSOLUTE: 0.2 K/UL (ref 0–0.6)
EOSINOPHILS RELATIVE PERCENT: 4 %
GFR AFRICAN AMERICAN: 58
GFR NON-AFRICAN AMERICAN: 48
GLOBULIN: 1.8 G/DL
GLUCOSE BLD-MCNC: 108 MG/DL (ref 70–99)
HCT VFR BLD CALC: 37.3 % (ref 36–48)
HDLC SERPL-MCNC: 51 MG/DL (ref 40–60)
HEMOGLOBIN: 12.3 G/DL (ref 12–16)
LDL CHOLESTEROL CALCULATED: 176 MG/DL
LYMPHOCYTES ABSOLUTE: 1.2 K/UL (ref 1–5.1)
LYMPHOCYTES RELATIVE PERCENT: 20.1 %
MCH RBC QN AUTO: 31.1 PG (ref 26–34)
MCHC RBC AUTO-ENTMCNC: 33.1 G/DL (ref 31–36)
MCV RBC AUTO: 94.1 FL (ref 80–100)
MONOCYTES ABSOLUTE: 0.6 K/UL (ref 0–1.3)
MONOCYTES RELATIVE PERCENT: 10.7 %
NEUTROPHILS ABSOLUTE: 3.7 K/UL (ref 1.7–7.7)
NEUTROPHILS RELATIVE PERCENT: 64.2 %
PDW BLD-RTO: 13.7 % (ref 12.4–15.4)
PLATELET # BLD: 174 K/UL (ref 135–450)
PMV BLD AUTO: 8.5 FL (ref 5–10.5)
POTASSIUM SERPL-SCNC: 4.7 MMOL/L (ref 3.5–5.1)
RBC # BLD: 3.96 M/UL (ref 4–5.2)
SODIUM BLD-SCNC: 141 MMOL/L (ref 136–145)
TOTAL PROTEIN: 6.2 G/DL (ref 6.4–8.2)
TRIGL SERPL-MCNC: 183 MG/DL (ref 0–150)
TSH SERPL DL<=0.05 MIU/L-ACNC: 1.68 UIU/ML (ref 0.27–4.2)
VLDLC SERPL CALC-MCNC: 37 MG/DL
WBC # BLD: 5.8 K/UL (ref 4–11)

## 2018-09-14 LAB
CRYPTOSPORIDIUM ANTIGEN STOOL: NORMAL
GI BACTERIAL PATHOGENS BY PCR: NORMAL
GIARDIA ANTIGEN STOOL: NORMAL

## 2018-09-20 ENCOUNTER — OFFICE VISIT (OUTPATIENT)
Dept: FAMILY MEDICINE CLINIC | Age: 78
End: 2018-09-20

## 2018-09-20 VITALS
OXYGEN SATURATION: 97 % | BODY MASS INDEX: 25.82 KG/M2 | HEART RATE: 76 BPM | TEMPERATURE: 97.3 F | DIASTOLIC BLOOD PRESSURE: 80 MMHG | WEIGHT: 160 LBS | SYSTOLIC BLOOD PRESSURE: 130 MMHG | RESPIRATION RATE: 18 BRPM

## 2018-09-20 DIAGNOSIS — I50.22 CHRONIC SYSTOLIC CONGESTIVE HEART FAILURE (HCC): ICD-10-CM

## 2018-09-20 DIAGNOSIS — Z12.11 COLON CANCER SCREENING: ICD-10-CM

## 2018-09-20 DIAGNOSIS — K52.9 CHRONIC DIARRHEA: Primary | ICD-10-CM

## 2018-09-20 DIAGNOSIS — J20.9 ACUTE BRONCHITIS, UNSPECIFIED ORGANISM: ICD-10-CM

## 2018-09-20 PROCEDURE — 4040F PNEUMOC VAC/ADMIN/RCVD: CPT | Performed by: FAMILY MEDICINE

## 2018-09-20 PROCEDURE — 4004F PT TOBACCO SCREEN RCVD TLK: CPT | Performed by: FAMILY MEDICINE

## 2018-09-20 PROCEDURE — 1123F ACP DISCUSS/DSCN MKR DOCD: CPT | Performed by: FAMILY MEDICINE

## 2018-09-20 PROCEDURE — 99213 OFFICE O/P EST LOW 20 MIN: CPT | Performed by: FAMILY MEDICINE

## 2018-09-20 PROCEDURE — 1101F PT FALLS ASSESS-DOCD LE1/YR: CPT | Performed by: FAMILY MEDICINE

## 2018-09-20 PROCEDURE — G8427 DOCREV CUR MEDS BY ELIG CLIN: HCPCS | Performed by: FAMILY MEDICINE

## 2018-09-20 PROCEDURE — G8399 PT W/DXA RESULTS DOCUMENT: HCPCS | Performed by: FAMILY MEDICINE

## 2018-09-20 PROCEDURE — G8598 ASA/ANTIPLAT THER USED: HCPCS | Performed by: FAMILY MEDICINE

## 2018-09-20 PROCEDURE — 1090F PRES/ABSN URINE INCON ASSESS: CPT | Performed by: FAMILY MEDICINE

## 2018-09-20 PROCEDURE — G8417 CALC BMI ABV UP PARAM F/U: HCPCS | Performed by: FAMILY MEDICINE

## 2018-09-20 RX ORDER — FUROSEMIDE 20 MG/1
20 TABLET ORAL DAILY
Qty: 7 TABLET | Refills: 0 | Status: SHIPPED | OUTPATIENT
Start: 2018-09-20 | End: 2022-05-31

## 2018-09-20 RX ORDER — AZITHROMYCIN 250 MG/1
TABLET, FILM COATED ORAL
Qty: 6 TABLET | Refills: 0 | Status: SHIPPED | OUTPATIENT
Start: 2018-09-20 | End: 2018-09-28

## 2018-09-20 NOTE — PATIENT INSTRUCTIONS
periods. Causes & Risk Factors   Do certain foods cause IBS? No. Foods don't cause IBS. But some foods may make you feel worse. Foods that may make symptoms worse include the following:  Drinks with caffeine, such as coffee, tea or soda   Milk products   Alcohol   Chocolate   Wheat, rye or barley   Keeping a diary for a few weeks may be a good way to find out if a food bothers you. Record what you eat and what your symptoms are. If you notice a pattern or think a food makes you feel worse, don't eat it. But don't cut out foods unless they have caused you problems more than once. If gas is a problem for you, you might want to avoid foods that tend to make gas worse. These include beans, cabbage and some fruits. If milk and other dairy products bother you, you may have lactose intolerance. Lactose intolerance means that your body can't digest lactose (the sugar in milk). If this seems to be the case, you may need to limit the amount of milk and milk products in your diet. Talk to your family doctor if you think you have trouble digesting dairy products. How can stress affect IBS? Stress may trigger symptoms in people who have IBS. Talk to your family doctor about ways to deal with stress, such as exercise, relaxation training or meditation. He or she may have some suggestions or may refer you to someone who can give you some ideas. Your doctor may also suggest that you talk to a counselor about things that are bothering you. Diagnosis & Tests   How is IBS diagnosed? Your doctor may start by asking you questions about your symptoms. If your symptoms have had a pattern over time, the pattern may make it clear to your doctor that IBS is the cause. If your symptoms have just started, something else may be the cause. Your doctor may need to do some tests, such as a blood test or colonoscopy, to make sure that your symptoms aren't caused by something else. Treatment   How is IBS treated?   There is no cure for IBS. The best way to handle your symptoms is to eat a healthy diet, avoid foods that seem to make you feel worse and find ways to handle your stress. Why may fiber be helpful? Fiber can be helpful because it improves how the intestines work. There are 2 types of fiber:  Soluble fiber helps both diarrhea and constipation. It dissolves in water and forms a gel-like material. Many foods, such as apples, beans and citrus fruits, contain soluble fiber. Psyllium, a natural vegetable fiber, is a also a soluble fiber. You can buy psyllium supplements (some brand names: Fiberall, Metamucil, Perdiem) to drink and you can add it to other foods. Insoluble fiber helps constipation by moving material through your digestive system and adding bulk to your stool. Insoluble fiber is in whole grain breads, wheat bran and many vegetables. Increase the fiber in your diet slowly. Some people feel bloated and have gas if they increase their fiber intake too quickly. Gas and bloating usually improve as you get used to eating more fiber. The best way to increase your fiber intake is eat a wide variety of high fiber foods. Can my doctor prescribe medicine for IBS? If your symptoms are severe, your doctor may prescribe medicine to help you manage or lessen your symptoms. For example, if your main symptom is pain, your doctor may prescribe antispasmodic medicines such as hyoscyamine or dicyclomine to reduce cramping. Heating pads and hot baths can also be comforting. If diarrhea is a frequent problem, medicine such as loperamide (brand name: Imodium) may help. Your doctor may give you tranquilizers or sedatives for short periods to treat anxiety that may be making your symptoms worse. Your doctor may prescribe an antidepressant for you if your symptoms are severe and you are feeling depressed. Will IBS get worse over time? No. While IBS will probably recur throughout your life, it won't get worse.  It doesn't cause

## 2018-09-20 NOTE — PROGRESS NOTES
UPPER RESPIRATORY VISIT   CHART REVIEW  Health Maintenance   Topic Date Due    Shingles Vaccine (1 of 2 - 2 Dose Series) 12/16/1990    DTaP/Tdap/Td vaccine (1 - Tdap) 08/04/2010    Flu vaccine (1) 09/01/2018    Potassium monitoring  09/13/2019    Creatinine monitoring  09/13/2019    DEXA (modify frequency per FRAX score)  Completed    Pneumococcal low/med risk  Completed     Social History     Social History    Marital status:      Spouse name: N/A    Number of children: N/A    Years of education: N/A     Social History Main Topics    Smoking status: Current Every Day Smoker     Packs/day: 0.25     Years: 25.00     Types: Cigarettes    Smokeless tobacco: Never Used      Comment: advised to quit, reducing some    Alcohol use No      Comment: recovering alcoholic- sober since 18/4840    Drug use: No      Comment: Hx fiorcet abuse    Sexual activity: Not Currently     Partners: Male     Other Topics Concern    None     Social History Narrative    Self-breast exams: Yes. Lives alone. Exercise: walking daily. Seatbelt use: Always. Living will: no,   additional information provided. The 10-year ASCVD risk score (Manjinder Contreras, et al., 2013) is: 34.9%    Values used to calculate the score:      Age: 68 years      Sex: Female      Is Non- : No      Diabetic: No      Tobacco smoker: Yes      Systolic Blood Pressure: 447 mmHg      Is BP treated: Yes      HDL Cholesterol: 51 mg/dL      Total Cholesterol: 264 mg/dL  Prior to Visit Medications    Medication Sig Taking?  Authorizing Provider   furosemide (LASIX) 20 MG tablet Take 1 tablet by mouth daily for 7 days Yes Ventura Avila MD   azithromycin (ZITHROMAX) 250 MG tablet Take 2 tabs today then 1 tab daily for 4 more days Yes Ventura Avila MD   meclizine (ANTIVERT) 25 MG tablet Take 1 tablet by mouth 3 times daily as needed for Dizziness or Nausea Yes Ventura Avila MD   pravastatin (PRAVACHOL) 80 MG tablet Take 1 tablet by mouth daily Yes Ventura Avila MD   carvedilol (COREG) 12.5 MG tablet Take 1 tablet by mouth 2 times daily (with meals) Yes Ventura Avila MD   Naproxen Sodium (ALEVE) 220 MG CAPS Take by mouth Yes Historical Provider, MD   sertraline (ZOLOFT) 100 MG tablet TAKE ONE TABLET BY MOUTH DAILY Yes Ventura Avila MD   aspirin EC 81 MG EC tablet Take 81 mg by mouth daily. Yes Historical Provider, MD   famotidine (PEPCID) 20 MG tablet Take 20 mg by mouth 2 times daily. Yes Historical Provider, MD   acetaminophen (TYLENOL ARTHRITIS PAIN) 650 MG CR tablet Take 650 mg by mouth every 8 hours as needed.    Yes Historical Provider, MD       Patient Active Problem List   Diagnosis    Hyperlipidemia with target LDL less than 100    Tobacco abuse    Vertigo, mild    Posttraumatic stress disorder    Screening mammogram, encounter for    GERD (gastroesophageal reflux disease)    Colon cancer screening    Sialadenitis- recurrent    Needs flu shot    Menopausal state    Major depressive disorder with single episode, in full remission ([facility] [address]    Pulmonary nodule 8mm, neg PET 2016, repeat CT per pulmonary    Chronic obstructive pulmonary disease ([facility] [address]    History of MI (myocardial infarction)    Hiatal hernia    Non-rheumatic mitral valve stenosis- mild-mod    Chronic systolic congestive heart failure ([facility] [address]- EF 30-35% 12/2016    Coronary artery disease involving native coronary artery without angina pectoris- cath 12/16    Essential hypertension      Cholesterol, Total   Date Value Ref Range Status   09/13/2018 264 (H) 0 - 199 mg/dL Final     LDL Calculated   Date Value Ref Range Status   09/13/2018 176 (H) <100 mg/dL Final     HDL   Date Value Ref Range Status   09/13/2018 51 40 - 60 mg/dL Final   09/22/2011 53 40 - 60 mg/dl Final     Triglycerides   Date Value Ref Range Status   09/13/2018 183 (H) 0 - 150 mg/dL Final     Lab Results   Component Value Date    GLUCOSE 108 (H) 09/13/2018     Lab Results   Component Value

## 2018-09-28 ENCOUNTER — TELEPHONE (OUTPATIENT)
Dept: FAMILY MEDICINE CLINIC | Age: 78
End: 2018-09-28

## 2018-09-28 RX ORDER — PREDNISONE 20 MG/1
20 TABLET ORAL 2 TIMES DAILY
Qty: 10 TABLET | Refills: 0 | Status: SHIPPED | OUTPATIENT
Start: 2018-09-28 | End: 2018-10-03

## 2018-09-28 RX ORDER — LEVOFLOXACIN 500 MG/1
500 TABLET, FILM COATED ORAL DAILY
Qty: 10 TABLET | Refills: 0 | Status: SHIPPED | OUTPATIENT
Start: 2018-09-28 | End: 2018-10-08

## 2018-11-28 ENCOUNTER — OFFICE VISIT (OUTPATIENT)
Dept: FAMILY MEDICINE CLINIC | Age: 78
End: 2018-11-28
Payer: MEDICARE

## 2018-11-28 VITALS
WEIGHT: 169 LBS | DIASTOLIC BLOOD PRESSURE: 80 MMHG | HEART RATE: 66 BPM | RESPIRATION RATE: 16 BRPM | BODY MASS INDEX: 27.16 KG/M2 | SYSTOLIC BLOOD PRESSURE: 132 MMHG | TEMPERATURE: 97.8 F | HEIGHT: 66 IN

## 2018-11-28 DIAGNOSIS — B02.9 HERPES ZOSTER WITHOUT COMPLICATION: Primary | ICD-10-CM

## 2018-11-28 PROCEDURE — G8484 FLU IMMUNIZE NO ADMIN: HCPCS | Performed by: FAMILY MEDICINE

## 2018-11-28 PROCEDURE — 4004F PT TOBACCO SCREEN RCVD TLK: CPT | Performed by: FAMILY MEDICINE

## 2018-11-28 PROCEDURE — G8417 CALC BMI ABV UP PARAM F/U: HCPCS | Performed by: FAMILY MEDICINE

## 2018-11-28 PROCEDURE — 1101F PT FALLS ASSESS-DOCD LE1/YR: CPT | Performed by: FAMILY MEDICINE

## 2018-11-28 PROCEDURE — G8399 PT W/DXA RESULTS DOCUMENT: HCPCS | Performed by: FAMILY MEDICINE

## 2018-11-28 PROCEDURE — G8598 ASA/ANTIPLAT THER USED: HCPCS | Performed by: FAMILY MEDICINE

## 2018-11-28 PROCEDURE — G8427 DOCREV CUR MEDS BY ELIG CLIN: HCPCS | Performed by: FAMILY MEDICINE

## 2018-11-28 PROCEDURE — 99213 OFFICE O/P EST LOW 20 MIN: CPT | Performed by: FAMILY MEDICINE

## 2018-11-28 PROCEDURE — 1090F PRES/ABSN URINE INCON ASSESS: CPT | Performed by: FAMILY MEDICINE

## 2018-11-28 PROCEDURE — 4040F PNEUMOC VAC/ADMIN/RCVD: CPT | Performed by: FAMILY MEDICINE

## 2018-11-28 PROCEDURE — 1123F ACP DISCUSS/DSCN MKR DOCD: CPT | Performed by: FAMILY MEDICINE

## 2018-11-28 RX ORDER — PREDNISONE 20 MG/1
20 TABLET ORAL 2 TIMES DAILY
Qty: 10 TABLET | Refills: 0 | Status: SHIPPED | OUTPATIENT
Start: 2018-11-28 | End: 2018-12-03

## 2018-11-28 RX ORDER — GABAPENTIN 300 MG/1
CAPSULE ORAL
Qty: 120 CAPSULE | Refills: 2 | Status: SHIPPED | OUTPATIENT
Start: 2018-11-28 | End: 2020-07-23

## 2018-11-28 RX ORDER — VALACYCLOVIR HYDROCHLORIDE 1 G/1
1000 TABLET, FILM COATED ORAL 2 TIMES DAILY
Qty: 10 TABLET | Refills: 0 | Status: SHIPPED | OUTPATIENT
Start: 2018-11-28 | End: 2018-12-03

## 2018-11-28 NOTE — PATIENT INSTRUCTIONS
cells. Most of the time your immune system keeps the virus in these cells. As you get older, or if your immune system gets weak, the varicella-zoster virus may escape from the nerve cells and cause shingles. If you have had the chickenpox vaccine, you are less likely to get chickenpox and therefore less likely to later develop shingles. Most people who get shingles are older than 48years of age or have a weak immune system. For example, you might get shingles if you have cancer, take medicines that weaken your immune system, or have HIV (human immunodeficiency virus) or AIDS (acquired immunodeficiency syndrome). Who gets HZO and why? It is not common in children, rather it occurs more often as people get older. Men and women are equally affected. People who develop HZO have typically had chickenpox or have been exposed to the chickenpox virus in the past.    Diagnosis & Tests   How can my doctor tell if I have HZO? If you have HZO, you will probably have a rash that looks like chickenpox, usually on one side of your face or forehead. Besides examining you, your doctor will not need to do any additional tests to see if you have HZO. Sometimes herpes simplex infections can resemble HZO. However, the patterns of the rashes are different and your doctor will be able to tell them apart. How often is the eye involved? The eye itself is affected in 10% of patients. Your doctor will examine you to see if your eye is affected by HZO. If it is, you may need to see an ophthalmologist (a doctor who specializes in treating eyes) for further treatment. Treatment   How is shingles treated? Shingles is often treated with an antiviral medicine to reduce the severity and duration of your symptoms. Acyclovir, famciclovir or valacyclovir are commonly prescribed. Your doctor will decide whether one of these medicines is right for you.  These medicines work better if you start taking them in the first 3 days after you get the

## 2018-11-28 NOTE — PROGRESS NOTES
complication      Cholesterol, Total   Date Value Ref Range Status   09/13/2018 264 (H) 0 - 199 mg/dL Final     LDL Calculated   Date Value Ref Range Status   09/13/2018 176 (H) <100 mg/dL Final     HDL   Date Value Ref Range Status   09/13/2018 51 40 - 60 mg/dL Final   09/22/2011 53 40 - 60 mg/dl Final     Triglycerides   Date Value Ref Range Status   09/13/2018 183 (H) 0 - 150 mg/dL Final     Lab Results   Component Value Date    GLUCOSE 108 (H) 09/13/2018     Lab Results   Component Value Date     09/13/2018    K 4.7 09/13/2018    CREATININE 1.1 09/13/2018     Lab Results   Component Value Date    WBC 5.8 09/13/2018    HGB 12.3 09/13/2018    HCT 37.3 09/13/2018    MCV 94.1 09/13/2018     09/13/2018     Lab Results   Component Value Date    ALT 8 (L) 09/13/2018    AST 17 09/13/2018    ALKPHOS 61 09/13/2018    BILITOT 0.4 09/13/2018     TSH (uIU/mL)   Date Value   09/13/2018 1.68     No results found for: LABA1C  Subjective:     Chief Complaint   Patient presents with    Rash     on back       Magda Mckeon is a 68 y.o. female who presents for evaluation of rash involving the back . Rash started 4 days ago. Rash is painful and is pruritic, burning, sensitive to touch, radiates to front. Associated symptoms: no associated symptoms. Patient denies: fever, sore throat. Patient has not had contacts with similar rash. Patient has not had new exposures (soaps, lotions, laundry detergents, foods, medications, plants, insects or animals.) Patient has not had any unusual travels. Review of Systems   General ROS: fever? No,    Night sweats? No  Endocrine ROS: fatigue? No   Unexpected weight changes? No  Hematological and Lymphatic ROS: easy bruising? No   Swollen lymph nodes? No  Respiratory ROS: cough? No   Wheezing? No  Cardiovascular ROS: chest pain? No   Shortness of breath? No  Gastrointestinal ROS: abdominal pain? No   Change in stools?  No    Patient's medications, allergies, past medical, surgical,

## 2018-12-12 ENCOUNTER — OFFICE VISIT (OUTPATIENT)
Dept: FAMILY MEDICINE CLINIC | Age: 78
End: 2018-12-12
Payer: MEDICARE

## 2018-12-12 VITALS
DIASTOLIC BLOOD PRESSURE: 80 MMHG | TEMPERATURE: 97.9 F | HEART RATE: 68 BPM | BODY MASS INDEX: 27.48 KG/M2 | SYSTOLIC BLOOD PRESSURE: 136 MMHG | RESPIRATION RATE: 16 BRPM | HEIGHT: 66 IN | WEIGHT: 171 LBS

## 2018-12-12 DIAGNOSIS — B02.9 HERPES ZOSTER WITHOUT COMPLICATION: Primary | ICD-10-CM

## 2018-12-12 DIAGNOSIS — Z23 NEEDS FLU SHOT: ICD-10-CM

## 2018-12-12 PROCEDURE — 99213 OFFICE O/P EST LOW 20 MIN: CPT | Performed by: FAMILY MEDICINE

## 2018-12-12 PROCEDURE — 1123F ACP DISCUSS/DSCN MKR DOCD: CPT | Performed by: FAMILY MEDICINE

## 2018-12-12 PROCEDURE — G8399 PT W/DXA RESULTS DOCUMENT: HCPCS | Performed by: FAMILY MEDICINE

## 2018-12-12 PROCEDURE — 4040F PNEUMOC VAC/ADMIN/RCVD: CPT | Performed by: FAMILY MEDICINE

## 2018-12-12 PROCEDURE — G8427 DOCREV CUR MEDS BY ELIG CLIN: HCPCS | Performed by: FAMILY MEDICINE

## 2018-12-12 PROCEDURE — 4004F PT TOBACCO SCREEN RCVD TLK: CPT | Performed by: FAMILY MEDICINE

## 2018-12-12 PROCEDURE — 1101F PT FALLS ASSESS-DOCD LE1/YR: CPT | Performed by: FAMILY MEDICINE

## 2018-12-12 PROCEDURE — G8482 FLU IMMUNIZE ORDER/ADMIN: HCPCS | Performed by: FAMILY MEDICINE

## 2018-12-12 PROCEDURE — 1090F PRES/ABSN URINE INCON ASSESS: CPT | Performed by: FAMILY MEDICINE

## 2018-12-12 PROCEDURE — G8598 ASA/ANTIPLAT THER USED: HCPCS | Performed by: FAMILY MEDICINE

## 2018-12-12 PROCEDURE — G8417 CALC BMI ABV UP PARAM F/U: HCPCS | Performed by: FAMILY MEDICINE
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
## 2018-12-13 PROCEDURE — 90662 IIV NO PRSV INCREASED AG IM: CPT | Performed by: FAMILY MEDICINE

## 2018-12-13 PROCEDURE — G0008 ADMIN INFLUENZA VIRUS VAC: HCPCS | Performed by: FAMILY MEDICINE

## 2019-01-05 DIAGNOSIS — I50.22 CHRONIC SYSTOLIC CONGESTIVE HEART FAILURE (HCC): ICD-10-CM

## 2019-01-05 DIAGNOSIS — I10 ESSENTIAL HYPERTENSION: ICD-10-CM

## 2019-01-07 RX ORDER — CARVEDILOL 12.5 MG/1
TABLET ORAL
Qty: 60 TABLET | Refills: 2 | Status: SHIPPED | OUTPATIENT
Start: 2019-01-07 | End: 2019-04-17 | Stop reason: SDUPTHER

## 2019-03-22 ENCOUNTER — OFFICE VISIT (OUTPATIENT)
Dept: FAMILY MEDICINE CLINIC | Age: 79
End: 2019-03-22
Payer: MEDICARE

## 2019-03-22 DIAGNOSIS — Z72.0 TOBACCO ABUSE: ICD-10-CM

## 2019-03-22 DIAGNOSIS — F32.5 MAJOR DEPRESSIVE DISORDER WITH SINGLE EPISODE, IN FULL REMISSION (HCC): ICD-10-CM

## 2019-03-22 DIAGNOSIS — E78.5 HYPERLIPIDEMIA WITH TARGET LDL LESS THAN 100: Primary | ICD-10-CM

## 2019-03-22 DIAGNOSIS — I25.10 CORONARY ARTERY DISEASE INVOLVING NATIVE CORONARY ARTERY OF NATIVE HEART WITHOUT ANGINA PECTORIS: ICD-10-CM

## 2019-03-22 PROCEDURE — 1123F ACP DISCUSS/DSCN MKR DOCD: CPT | Performed by: FAMILY MEDICINE

## 2019-03-22 PROCEDURE — G8399 PT W/DXA RESULTS DOCUMENT: HCPCS | Performed by: FAMILY MEDICINE

## 2019-03-22 PROCEDURE — G8482 FLU IMMUNIZE ORDER/ADMIN: HCPCS | Performed by: FAMILY MEDICINE

## 2019-03-22 PROCEDURE — G8428 CUR MEDS NOT DOCUMENT: HCPCS | Performed by: FAMILY MEDICINE

## 2019-03-22 PROCEDURE — 4040F PNEUMOC VAC/ADMIN/RCVD: CPT | Performed by: FAMILY MEDICINE

## 2019-03-22 PROCEDURE — 1090F PRES/ABSN URINE INCON ASSESS: CPT | Performed by: FAMILY MEDICINE

## 2019-03-22 PROCEDURE — 4004F PT TOBACCO SCREEN RCVD TLK: CPT | Performed by: FAMILY MEDICINE

## 2019-03-22 PROCEDURE — G8598 ASA/ANTIPLAT THER USED: HCPCS | Performed by: FAMILY MEDICINE

## 2019-03-22 PROCEDURE — 99213 OFFICE O/P EST LOW 20 MIN: CPT | Performed by: FAMILY MEDICINE

## 2019-03-22 PROCEDURE — G8417 CALC BMI ABV UP PARAM F/U: HCPCS | Performed by: FAMILY MEDICINE

## 2019-03-22 PROCEDURE — 1101F PT FALLS ASSESS-DOCD LE1/YR: CPT | Performed by: FAMILY MEDICINE

## 2019-03-22 RX ORDER — DULOXETIN HYDROCHLORIDE 60 MG/1
60 CAPSULE, DELAYED RELEASE ORAL DAILY
Qty: 30 CAPSULE | Refills: 5 | Status: SHIPPED | OUTPATIENT
Start: 2019-03-22 | End: 2019-07-25

## 2019-03-22 RX ORDER — PRAVASTATIN SODIUM 40 MG
40 TABLET ORAL DAILY
Qty: 90 TABLET | Refills: 1 | Status: SHIPPED | OUTPATIENT
Start: 2019-03-22 | End: 2020-05-29

## 2019-03-22 RX ORDER — DULOXETIN HYDROCHLORIDE 30 MG/1
CAPSULE, DELAYED RELEASE ORAL
Qty: 30 CAPSULE | Refills: 0 | Status: SHIPPED | OUTPATIENT
Start: 2019-03-22 | End: 2019-07-25

## 2019-04-16 DIAGNOSIS — I50.22 CHRONIC SYSTOLIC CONGESTIVE HEART FAILURE (HCC): ICD-10-CM

## 2019-04-16 DIAGNOSIS — I10 ESSENTIAL HYPERTENSION: ICD-10-CM

## 2019-04-17 RX ORDER — CARVEDILOL 12.5 MG/1
TABLET ORAL
Qty: 60 TABLET | Refills: 2 | Status: SHIPPED | OUTPATIENT
Start: 2019-04-17 | End: 2019-07-28 | Stop reason: SDUPTHER

## 2019-06-26 ENCOUNTER — TELEPHONE (OUTPATIENT)
Dept: PHARMACY | Facility: CLINIC | Age: 79
End: 2019-06-26

## 2019-07-25 ENCOUNTER — OFFICE VISIT (OUTPATIENT)
Dept: FAMILY MEDICINE CLINIC | Age: 79
End: 2019-07-25
Payer: MEDICARE

## 2019-07-25 VITALS
BODY MASS INDEX: 27.48 KG/M2 | RESPIRATION RATE: 16 BRPM | HEIGHT: 66 IN | TEMPERATURE: 97.6 F | WEIGHT: 171 LBS | DIASTOLIC BLOOD PRESSURE: 82 MMHG | HEART RATE: 93 BPM | SYSTOLIC BLOOD PRESSURE: 122 MMHG

## 2019-07-25 DIAGNOSIS — K59.1 FUNCTIONAL DIARRHEA: ICD-10-CM

## 2019-07-25 DIAGNOSIS — I25.10 CORONARY ARTERY DISEASE INVOLVING NATIVE CORONARY ARTERY OF NATIVE HEART WITHOUT ANGINA PECTORIS: ICD-10-CM

## 2019-07-25 DIAGNOSIS — F32.5 MAJOR DEPRESSIVE DISORDER WITH SINGLE EPISODE, IN FULL REMISSION (HCC): Primary | ICD-10-CM

## 2019-07-25 DIAGNOSIS — Z72.0 TOBACCO ABUSE: ICD-10-CM

## 2019-07-25 PROCEDURE — G8399 PT W/DXA RESULTS DOCUMENT: HCPCS | Performed by: FAMILY MEDICINE

## 2019-07-25 PROCEDURE — G8598 ASA/ANTIPLAT THER USED: HCPCS | Performed by: FAMILY MEDICINE

## 2019-07-25 PROCEDURE — G8427 DOCREV CUR MEDS BY ELIG CLIN: HCPCS | Performed by: FAMILY MEDICINE

## 2019-07-25 PROCEDURE — 99213 OFFICE O/P EST LOW 20 MIN: CPT | Performed by: FAMILY MEDICINE

## 2019-07-25 PROCEDURE — G8417 CALC BMI ABV UP PARAM F/U: HCPCS | Performed by: FAMILY MEDICINE

## 2019-07-25 PROCEDURE — 1090F PRES/ABSN URINE INCON ASSESS: CPT | Performed by: FAMILY MEDICINE

## 2019-07-25 PROCEDURE — 1123F ACP DISCUSS/DSCN MKR DOCD: CPT | Performed by: FAMILY MEDICINE

## 2019-07-25 PROCEDURE — 4004F PT TOBACCO SCREEN RCVD TLK: CPT | Performed by: FAMILY MEDICINE

## 2019-07-25 PROCEDURE — 4040F PNEUMOC VAC/ADMIN/RCVD: CPT | Performed by: FAMILY MEDICINE

## 2019-07-25 RX ORDER — SERTRALINE HYDROCHLORIDE 100 MG/1
TABLET, FILM COATED ORAL
Qty: 90 TABLET | Refills: 1 | Status: SHIPPED | OUTPATIENT
Start: 2019-07-25 | End: 2020-05-29

## 2019-07-25 NOTE — PROGRESS NOTES
risk score (Tara Hernandez et al., 2013) is: 33.5%    Values used to calculate the score:      Age: 66 years      Sex: Female      Is Non- : No      Diabetic: No      Tobacco smoker: Yes      Systolic Blood Pressure: 511 mmHg      Is BP treated: Yes      HDL Cholesterol: 51 mg/dL      Total Cholesterol: 264 mg/dL  Prior to Visit Medications    Medication Sig Taking? Authorizing Provider   sertraline (ZOLOFT) 100 MG tablet TAKE ONE TABLET BY MOUTH DAILY Yes Luana Shelton MD   carvedilol (COREG) 12.5 MG tablet TAKE ONE TABLET BY MOUTH TWICE A DAY WITH MEALS Yes Luana Shelton MD   pravastatin (PRAVACHOL) 40 MG tablet Take 1 tablet by mouth daily Yes Luana Shelton MD   gabapentin (NEURONTIN) 300 MG capsule Take 2 hs x 2d; then 1 AM and 2 HS x 3 d; then 1 AM, and 3 HS. [place] Yes Luana Shelton MD   furosemide (LASIX) 20 MG tablet Take 1 tablet by mouth daily for 7 days Yes Luana Shelton MD   meclizine (ANTIVERT) 25 MG tablet Take 1 tablet by mouth 3 times daily as needed for Dizziness or Nausea Yes Luana Shelton MD   Naproxen Sodium (ALEVE) 220 MG CAPS Take by mouth Yes Historical Provider, MD   aspirin EC 81 MG EC tablet Take 81 mg by mouth daily. Yes Historical Provider, MD   famotidine (PEPCID) 20 MG tablet Take 20 mg by mouth 2 times daily. Yes Historical Provider, MD   acetaminophen (TYLENOL ARTHRITIS PAIN) 650 MG CR tablet Take 650 mg by mouth every 8 hours as needed. Yes Historical Provider, MD      No family history on file.   Social History     Tobacco Use    Smoking status: Current Every Day Smoker     Packs/day: 0.25     Years: 25.00     Pack years: 6.25     Types: Cigarettes    Smokeless tobacco: Never Used    Tobacco comment: advised to quit, reducing some   Substance Use Topics    Alcohol use: No     Alcohol/week: 0.0 standard drinks     Comment: recovering alcoholic- sober since 70/3200    Drug use: No     Comment: Hx fiorcet abuse      LAST LABS  Cholesterol, Total   Date Value Ref Range Status   09/13/2018 264 (H) 0 - 199 mg/dL Final     LDL Calculated   Date Value Ref Range Status   09/13/2018 176 (H) <100 mg/dL Final     HDL   Date Value Ref Range Status   09/13/2018 51 40 - 60 mg/dL Final   09/22/2011 53 40 - 60 mg/dl Final     Triglycerides   Date Value Ref Range Status   09/13/2018 183 (H) 0 - 150 mg/dL Final     Lab Results   Component Value Date    GLUCOSE 108 (H) 09/13/2018     Lab Results   Component Value Date     09/13/2018    K 4.7 09/13/2018    CREATININE 1.1 09/13/2018     Lab Results   Component Value Date    WBC 5.8 09/13/2018    HGB 12.3 09/13/2018    HCT 37.3 09/13/2018    MCV 94.1 09/13/2018     09/13/2018     Lab Results   Component Value Date    ALT 8 (L) 09/13/2018    AST 17 09/13/2018    ALKPHOS 61 09/13/2018    BILITOT 0.4 09/13/2018     TSH (uIU/mL)   Date Value   09/13/2018 1.68     No results found for: LABA1C     Objective:   PHYSICAL EXAM   /82 (Site: Left Upper Arm, Position: Sitting, Cuff Size: Large Adult)   Pulse 93   Temp 97.6 °F (36.4 °C) (Oral)   Resp 16   Ht 5' 6\" (1.676 m)   Wt 171 lb (77.6 kg)   BMI 27.60 kg/m²   BP Readings from Last 5 Encounters:   07/25/19 122/82   12/12/18 136/80   11/28/18 132/80   09/20/18 130/80   09/05/18 138/80     Wt Readings from Last 5 Encounters:   07/25/19 171 lb (77.6 kg)   12/12/18 171 lb (77.6 kg)   11/28/18 169 lb (76.7 kg)   09/20/18 160 lb (72.6 kg)   09/05/18 167 lb (75.8 kg)      GENERAL:   · well-developed, well-nourished, alert, no distress.      EYES:   · External findings: lids and lashes normal and conjunctivae and sclerae normal  LUNGS:    · Breathing unlabored  · clear to auscultation bilaterally and good air movement  CARDIOVASC:   · regular rate and rhythm, S1, S2 normal. No murmur, click, rub or gallop  · LEGS:  Lower extremity edema: none    SKIN: warm and dry  PSYCH:    · Alert and oriented  · Normal reasoning, insight good  · Facial expressions full, mood appropriate  · No memory disturbance noted     Assessment and Plan:      Diagnosis Orders   1. Major depressive disorder with single episode, in full remission (HCC)  sertraline (ZOLOFT) 100 MG tablet   2. Coronary artery disease involving native coronary artery of native heart without angina pectoris     3. Tobacco abuse     4. Functional diarrhea     Stable. Continue current Tx plan except for changes marked below. INSTRUCTIONS  NEXT APPOINTMENT: Please schedule fasting annual physical (30 minutes) in 2 months. OK to have water and medications (except for diabetes medicines). · PLEASE TAKE THIS FORM TO CHECK-OUT WINDOW TO SCHEDULE NEXT VISIT. · Please get flu vaccine when available in fall. Can get either at this office or at stores such as [employer] and [address]. · OK to switch back to zoloft. Alternate every other day with remaining cymbalta until gone. [place] [facility] [address]  [address] [place] [address], [address]   [phone]  [email or web address]  Read about irritable bowel. Try increasing fiber in diet.

## 2019-07-25 NOTE — PATIENT INSTRUCTIONS
big meal. Some people are bothered by certain foods. Women who have IBS may notice more frequent symptoms during their menstrual periods. Causes & Risk Factors   Do certain foods cause IBS? No. Foods don't cause IBS. But some foods may make you feel worse. Foods that may make symptoms worse include the following:  Drinks with caffeine, such as coffee, tea or soda   Milk products   Alcohol   Chocolate   Wheat, rye or barley   Keeping a diary for a few weeks may be a good way to find out if a food bothers you. Record what you eat and what your symptoms are. If you notice a pattern or think a food makes you feel worse, don't eat it. But don't cut out foods unless they have caused you problems more than once. If gas is a problem for you, you might want to avoid foods that tend to make gas worse. These include beans, cabbage and some fruits. If milk and other dairy products bother you, you may have lactose intolerance. Lactose intolerance means that your body can't digest lactose (the sugar in milk). If this seems to be the case, you may need to limit the amount of milk and milk products in your diet. Talk to your family doctor if you think you have trouble digesting dairy products. How can stress affect IBS? Stress may trigger symptoms in people who have IBS. Talk to your family doctor about ways to deal with stress, such as exercise, relaxation training or meditation. He or she may have some suggestions or may refer you to someone who can give you some ideas. Your doctor may also suggest that you talk to a counselor about things that are bothering you. Diagnosis & Tests   How is IBS diagnosed? Your doctor may start by asking you questions about your symptoms. If your symptoms have had a pattern over time, the pattern may make it clear to your doctor that IBS is the cause. If your symptoms have just started, something else may be the cause.  Your doctor may need to do some tests, such as a blood test

## 2019-07-28 DIAGNOSIS — I50.22 CHRONIC SYSTOLIC CONGESTIVE HEART FAILURE (HCC): ICD-10-CM

## 2019-07-28 DIAGNOSIS — I10 ESSENTIAL HYPERTENSION: ICD-10-CM

## 2019-07-29 RX ORDER — CARVEDILOL 12.5 MG/1
TABLET ORAL
Qty: 60 TABLET | Refills: 1 | Status: SHIPPED | OUTPATIENT
Start: 2019-07-29 | End: 2019-09-30 | Stop reason: SDUPTHER

## 2019-08-20 ENCOUNTER — TELEPHONE (OUTPATIENT)
Dept: PHARMACY | Facility: CLINIC | Age: 79
End: 2019-08-20

## 2019-08-23 NOTE — TELEPHONE ENCOUNTER
CLINICAL PHARMACY CONSULT: MED RECONCILIATION/REVIEW ADDENDUM    For Pharmacy Admin Tracking Only    PHSO: Yes  Total # of Interventions Recommended: 1  - New Order #: 0 New Medication Order Reason(s): Adherence  - Maintenance Safety Lab Monitoring #: 1  Recommended intervention potential cost savings: 1    Time Spent (min): 15    Sarah Guillory CP.   [address]

## 2019-09-30 DIAGNOSIS — I10 ESSENTIAL HYPERTENSION: ICD-10-CM

## 2019-09-30 DIAGNOSIS — I50.22 CHRONIC SYSTOLIC CONGESTIVE HEART FAILURE (HCC): ICD-10-CM

## 2019-10-01 RX ORDER — CARVEDILOL 12.5 MG/1
TABLET ORAL
Qty: 60 TABLET | Refills: 1 | Status: SHIPPED | OUTPATIENT
Start: 2019-10-01 | End: 2019-11-26 | Stop reason: SDUPTHER

## 2019-11-20 ENCOUNTER — TELEPHONE (OUTPATIENT)
Dept: FAMILY MEDICINE CLINIC | Age: 79
End: 2019-11-20

## 2019-11-26 DIAGNOSIS — I10 ESSENTIAL HYPERTENSION: ICD-10-CM

## 2019-11-26 DIAGNOSIS — I50.22 CHRONIC SYSTOLIC CONGESTIVE HEART FAILURE (HCC): ICD-10-CM

## 2019-12-02 RX ORDER — CARVEDILOL 12.5 MG/1
TABLET ORAL
Qty: 60 TABLET | Refills: 1 | Status: SHIPPED | OUTPATIENT
Start: 2019-12-02 | End: 2020-02-03

## 2019-12-04 ENCOUNTER — OFFICE VISIT (OUTPATIENT)
Dept: FAMILY MEDICINE CLINIC | Age: 79
End: 2019-12-04
Payer: MEDICARE

## 2019-12-04 VITALS
SYSTOLIC BLOOD PRESSURE: 154 MMHG | WEIGHT: 173.4 LBS | BODY MASS INDEX: 27.87 KG/M2 | DIASTOLIC BLOOD PRESSURE: 86 MMHG | HEIGHT: 66 IN | RESPIRATION RATE: 14 BRPM | HEART RATE: 65 BPM | OXYGEN SATURATION: 99 %

## 2019-12-04 DIAGNOSIS — F32.5 MAJOR DEPRESSIVE DISORDER WITH SINGLE EPISODE, IN FULL REMISSION (HCC): ICD-10-CM

## 2019-12-04 DIAGNOSIS — E78.5 HYPERLIPIDEMIA WITH TARGET LDL LESS THAN 100: Primary | ICD-10-CM

## 2019-12-04 DIAGNOSIS — I10 ESSENTIAL HYPERTENSION: ICD-10-CM

## 2019-12-04 DIAGNOSIS — R19.7 DIARRHEA, UNSPECIFIED TYPE: ICD-10-CM

## 2019-12-04 DIAGNOSIS — Z23 NEED FOR INFLUENZA VACCINATION: ICD-10-CM

## 2019-12-04 PROCEDURE — G8417 CALC BMI ABV UP PARAM F/U: HCPCS | Performed by: INTERNAL MEDICINE

## 2019-12-04 PROCEDURE — G8427 DOCREV CUR MEDS BY ELIG CLIN: HCPCS | Performed by: INTERNAL MEDICINE

## 2019-12-04 PROCEDURE — G0008 ADMIN INFLUENZA VIRUS VAC: HCPCS | Performed by: INTERNAL MEDICINE

## 2019-12-04 PROCEDURE — 1090F PRES/ABSN URINE INCON ASSESS: CPT | Performed by: INTERNAL MEDICINE

## 2019-12-04 PROCEDURE — 90653 IIV ADJUVANT VACCINE IM: CPT | Performed by: INTERNAL MEDICINE

## 2019-12-04 PROCEDURE — G8598 ASA/ANTIPLAT THER USED: HCPCS | Performed by: INTERNAL MEDICINE

## 2019-12-04 PROCEDURE — 4004F PT TOBACCO SCREEN RCVD TLK: CPT | Performed by: INTERNAL MEDICINE

## 2019-12-04 PROCEDURE — 1123F ACP DISCUSS/DSCN MKR DOCD: CPT | Performed by: INTERNAL MEDICINE

## 2019-12-04 PROCEDURE — 99214 OFFICE O/P EST MOD 30 MIN: CPT | Performed by: INTERNAL MEDICINE

## 2019-12-04 PROCEDURE — 4040F PNEUMOC VAC/ADMIN/RCVD: CPT | Performed by: INTERNAL MEDICINE

## 2019-12-04 PROCEDURE — G8399 PT W/DXA RESULTS DOCUMENT: HCPCS | Performed by: INTERNAL MEDICINE

## 2019-12-04 PROCEDURE — G8482 FLU IMMUNIZE ORDER/ADMIN: HCPCS | Performed by: INTERNAL MEDICINE

## 2019-12-04 ASSESSMENT — ENCOUNTER SYMPTOMS
COUGH: 0
SHORTNESS OF BREATH: 0
DIARRHEA: 1
BLOOD IN STOOL: 0
ABDOMINAL PAIN: 0

## 2020-02-03 RX ORDER — CARVEDILOL 12.5 MG/1
TABLET ORAL
Qty: 180 TABLET | Refills: 1 | Status: SHIPPED | OUTPATIENT
Start: 2020-02-03 | End: 2020-07-28 | Stop reason: SDUPTHER

## 2020-05-29 RX ORDER — PRAVASTATIN SODIUM 40 MG
TABLET ORAL
Qty: 90 TABLET | Refills: 0 | Status: SHIPPED | OUTPATIENT
Start: 2020-05-29 | End: 2020-09-08

## 2020-06-02 RX ORDER — SERTRALINE HYDROCHLORIDE 100 MG/1
TABLET, FILM COATED ORAL
Qty: 90 TABLET | Refills: 0 | OUTPATIENT
Start: 2020-06-02

## 2020-06-11 ENCOUNTER — TELEPHONE (OUTPATIENT)
Dept: PHARMACY | Facility: CLINIC | Age: 80
End: 2020-06-11

## 2020-07-23 ENCOUNTER — TELEMEDICINE (OUTPATIENT)
Dept: FAMILY MEDICINE CLINIC | Age: 80
End: 2020-07-23
Payer: MEDICARE

## 2020-07-23 PROCEDURE — 99213 OFFICE O/P EST LOW 20 MIN: CPT | Performed by: NURSE PRACTITIONER

## 2020-07-29 RX ORDER — CARVEDILOL 12.5 MG/1
TABLET ORAL
Qty: 180 TABLET | Refills: 1 | Status: SHIPPED | OUTPATIENT
Start: 2020-07-29 | End: 2021-02-03 | Stop reason: SDUPTHER

## 2020-09-04 ENCOUNTER — TELEMEDICINE (OUTPATIENT)
Dept: FAMILY MEDICINE CLINIC | Age: 80
End: 2020-09-04
Payer: MEDICARE

## 2020-09-04 PROCEDURE — G0439 PPPS, SUBSEQ VISIT: HCPCS | Performed by: FAMILY MEDICINE

## 2020-09-04 PROCEDURE — 4040F PNEUMOC VAC/ADMIN/RCVD: CPT | Performed by: FAMILY MEDICINE

## 2020-09-04 PROCEDURE — 99442 PR PHYS/QHP TELEPHONE EVALUATION 11-20 MIN: CPT | Performed by: FAMILY MEDICINE

## 2020-09-04 PROCEDURE — 1123F ACP DISCUSS/DSCN MKR DOCD: CPT | Performed by: FAMILY MEDICINE

## 2020-09-04 RX ORDER — SERTRALINE HYDROCHLORIDE 100 MG/1
TABLET, FILM COATED ORAL
Qty: 90 TABLET | Refills: 1 | Status: SHIPPED | OUTPATIENT
Start: 2020-09-04 | End: 2021-07-15

## 2020-09-04 ASSESSMENT — LIFESTYLE VARIABLES: HOW OFTEN DO YOU HAVE A DRINK CONTAINING ALCOHOL: 0

## 2020-09-04 ASSESSMENT — PATIENT HEALTH QUESTIONNAIRE - PHQ9
SUM OF ALL RESPONSES TO PHQ QUESTIONS 1-9: 2
SUM OF ALL RESPONSES TO PHQ QUESTIONS 1-9: 2

## 2020-09-04 NOTE — PROGRESS NOTES
Medicare Annual Wellness Visit  Name: Meir Scott  YOB: 1940  Age: 78 y.o. Sex: female  MRN: [id number]     Date of Service:  9/4/2020    Chief Complaint:   Meir Scott is a 78 y.o. female who presents for Medicare Annual Wellness Visit and check-up for:  1. Routine general medical examination at a health care facility    2. Tobacco abuse    3. Chronic obstructive pulmonary disease, unspecified COPD type ([facility]    4. Chronic systolic congestive heart failure ([facility] [facility]    5. Major depressive disorder with single episode, in full remission ([facility] [facility]    6. Hyperlipidemia with target LDL less than 100    7. Coronary artery disease involving native coronary artery of native heart without angina pectoris    8. Essential hypertension    9. Chronic diarrhea      HPI    Chief Complaint   Patient presents with    Medicare AWV   Complaints: pt is having diahrrea 5 days out of 7 days a week for the past few months. Started a probiotic 8 days ago but hasnt helped at all     daily diarrhea for over a year, watery 2-3x, not want to leave house    Breathing is doing fine    Review of Systems   General ROS: fever? No,    Night sweats? No  Ophthalmic ROS: change in vision? No  Endocrine ROS: fatigue? Yes   Unexpected weight changes? No  Hematologic/Lymphatic: easy bruising? Yes   Swollen lymph nodes? No  ENT ROS: headaches? No   Sore throat? No  Respiratory ROS: cough? No   Wheezing? No  Cardiovascular ROS: chest pain? No   Shortness of breath? No  Gastrointestinal ROS: abdominal pain? No   Change in stools? Yes   Genito-Urinary ROS: painful urination? No   Trouble urinating? No  Musculoskeletal ROS: trouble walking? No   Joint pain? No  Neurological ROS: TIA or stroke symptoms? No   Numbness/tingling? No  Dermatological ROS: rash? No   Changes in skin spots?   No    Health Maintenance Due   Topic Date Due    DTaP/Tdap/Td vaccine (1 - Tdap) 12/16/1959    Shingles Vaccine (1 of 2) 12/16/1990    Annual Wellness Visit (AWV)  05/29/2019    Lipid screen  09/13/2019    Potassium monitoring  09/13/2019    Creatinine monitoring  09/13/2019    Flu vaccine (1) 09/01/2020     Documentation provided by medical assistant reviewed and updated by provider. HISTORY:  Patient's medications, allergies, past medical, and social histories were reviewed and updated as appropriate. CHART REVIEW  Health Maintenance   Topic Date Due    DTaP/Tdap/Td vaccine (1 - Tdap) 12/16/1959    Shingles Vaccine (1 of 2) 12/16/1990    Annual Wellness Visit (AWV)  05/29/2019    Lipid screen  09/13/2019    Potassium monitoring  09/13/2019    Creatinine monitoring  09/13/2019    Flu vaccine (1) 09/01/2020    DEXA (modify frequency per FRAX score)  Completed    Pneumococcal 65+ years Vaccine  Completed    Hepatitis A vaccine  Aged Out    Hepatitis B vaccine  Aged Out    Hib vaccine  Aged Out    Meningococcal (ACWY) vaccine  Aged Out     The 10-year ASCVD risk score (Matthew Rodriguez., et al., 2013) is: 50.7%    Values used to calculate the score:      Age: 78 years      Sex: Female      Is Non- : No      Diabetic: No      Tobacco smoker: Yes      Systolic Blood Pressure: 457 mmHg      Is BP treated: Yes      HDL Cholesterol: 51 mg/dL      Total Cholesterol: 264 mg/dL  Prior to Visit Medications    Medication Sig Taking? Authorizing Provider   sertraline (ZOLOFT) 100 MG tablet TAKE ONE TABLET BY MOUTH DAILY Yes Frank Mcgowan MD   carvedilol (COREG) 12.5 MG tablet TAKE ONE TABLET BY MOUTH TWICE A DAY WITH MEALS Yes Frank Mcgowan MD   pravastatin (PRAVACHOL) 40 MG tablet TAKE ONE TABLET BY MOUTH DAILY Yes Frank Mcgowan MD   furosemide (LASIX) 20 MG tablet Take 1 tablet by mouth daily for 7 days Yes Frank Mcgowan MD   Naproxen Sodium (ALEVE) 220 MG CAPS Take by mouth Yes Historical Provider, MD   aspirin EC 81 MG EC tablet Take 81 mg by mouth daily.    Yes Historical Provider, MD   famotidine (PEPCID) 20 MG tablet Take 20 mg by mouth 2 times daily. Yes Historical Provider, MD   acetaminophen (TYLENOL ARTHRITIS PAIN) 650 MG CR tablet Take 650 mg by mouth every 8 hours as needed.    Yes Historical Provider, MD      Family History   Problem Relation Age of Onset    Ulcerative Colitis Brother      Social History     Tobacco Use    Smoking status: Current Every Day Smoker     Packs/day: 0.25     Years: 25.00     Pack years: 6.25     Types: Cigarettes    Smokeless tobacco: Never Used    Tobacco comment: advised to quit, reducing some   Substance Use Topics    Alcohol use: No     Alcohol/week: 0.0 standard drinks     Comment: recovering alcoholic- sober since 28/5096    Drug use: No     Comment: Hx fiorcet abuse      LAST LABS  Cholesterol, Total   Date Value Ref Range Status   09/13/2018 264 (H) 0 - 199 mg/dL Final     LDL Calculated   Date Value Ref Range Status   09/13/2018 176 (H) <100 mg/dL Final     HDL   Date Value Ref Range Status   09/13/2018 51 40 - 60 mg/dL Final   09/22/2011 53 40 - 60 mg/dl Final     Triglycerides   Date Value Ref Range Status   09/13/2018 183 (H) 0 - 150 mg/dL Final     Lab Results   Component Value Date    GLUCOSE 108 (H) 09/13/2018     Lab Results   Component Value Date     09/13/2018    K 4.7 09/13/2018    CREATININE 1.1 09/13/2018     Lab Results   Component Value Date    WBC 5.8 09/13/2018    HGB 12.3 09/13/2018    HCT 37.3 09/13/2018    MCV 94.1 09/13/2018     09/13/2018     Lab Results   Component Value Date    ALT 8 (L) 09/13/2018    AST 17 09/13/2018    ALKPHOS 61 09/13/2018    BILITOT 0.4 09/13/2018     TSH (uIU/mL)   Date Value   09/13/2018 1.68     No results found for: LABA1C     Positive Risk Factor Screenings with Interventions:     Fall Risk:  2 or more falls in past year?: (!) yes  Fall with injury in past year?: no  Fall Risk Interventions:    · Patient declines any further evaluation/treatment for this issue      Substance Abuse:  Social History     Tobacco History     Smoking Status  Current Every Day Smoker Smoking Frequency  0.25 packs/day for 25 years (6.25 pk yrs) Smoking Tobacco Type  Cigarettes    Smokeless Tobacco Use  Never Used    Tobacco Comment  advised to quit, reducing some          Alcohol History     Alcohol Use Status  No Comment  recovering alcoholic- sober since 98/6305          Drug Use     Drug Use Status  No Comment  Hx fiorcet abuse          Sexual Activity     Sexually Active  Not Currently Partners  Male               Audit Questionnaire: Screen for Alcohol Misuse  How often do you have a drink containing alcohol?: Never  Substance Abuse Interventions:  · Tobacco abuse:  patient is not ready to work toward tobacco cessation at this time    General Health:  General  In general, how would you say your health is?: (!) Poor  In the past 7 days, have you experienced any of the following? New or Increased Pain, New or Increased Fatigue, Loneliness, Social Isolation, Stress or Anger?: (!) New or Increased Fatigue, Stress  Do you get the social and emotional support that you need?: Yes  Do you have a Living Will?: (!) No  General Health Risk Interventions:  · Poor self-assessment of health status: patient declines any further evaluation/treatment for this issue  · Fatigue: labs ordered- see A/P  · No Living Will: patient declined      Health Habits/Nutrition:  Health Habits/Nutrition  Do you exercise for at least 20 minutes 2-3 times per week?: Yes  Have you lost any weight without trying in the past 3 months?: No  Do you eat fewer than 2 meals per day?: (!) Yes  Have you seen a dentist within the past year?: (!) No  There is no height or weight on file to calculate BMI.   Health Habits/Nutrition Interventions:  · Nutritional issues:  patient is not ready to address his/her nutritional/weight issues at this time  · Dental exam overdue:  patient declines dental evaluation    Hearing/Vision:  No exam data present  Hearing/Vision  Do you or your family notice any trouble with your hearing?: No  Do you have difficulty driving, watching TV, or doing any of your daily activities because of your eyesight?: (!) Yes  Have you had an eye exam within the past year?: (!) No  Hearing/Vision Interventions:  · Vision concerns:  patient encouraged to make appointment with his/her eye specialist     Assessment and Plan:      Diagnosis Orders   1. Routine general medical examination at a health care facility     2. Tobacco abuse     3. Chronic obstructive pulmonary disease, unspecified COPD type ([facility] [address]     4. Chronic systolic congestive heart failure ([facility] [address] [facility]     5. Major depressive disorder with single episode, in full remission (HCC)  sertraline (ZOLOFT) 100 MG tablet   6. Hyperlipidemia with target LDL less than 100  Lipid Panel   7. Coronary artery disease involving native coronary artery of native heart without angina pectoris  Comprehensive Metabolic Panel    CBC Auto Differential   8. Essential hypertension  Comprehensive Metabolic Panel   9. Chronic diarrhea  Comprehensive Metabolic Panel    CBC Auto Differential    Amb External Referral To Gastroenterology     INSTRUCTIONS  NEXT APPOINTMENT: Please schedule check-up in 3 months. I affirm this is a Patient Initiated Episode with an Established Patient who has not had a related appointment within my department in the past 7 days or scheduled within the next 24 hours.     Total Time: minutes: 11-20 minutes    Note: not billable if this call serves to triage the patient into an appointment for the relevant concern    Regan Vidal

## 2020-09-04 NOTE — PATIENT INSTRUCTIONS
Personalized Preventive Plan for Kirill Mari - 9/4/2020  Medicare offers a range of preventive health benefits. Some of the tests and screenings are paid in full while other may be subject to a deductible, co-insurance, and/or copay. Some of these benefits include a comprehensive review of your medical history including lifestyle, illnesses that may run in your family, and various assessments and screenings as appropriate. After reviewing your medical record and screening and assessments performed today your provider may have ordered immunizations, labs, imaging, and/or referrals for you. A list of these orders (if applicable) as well as your Preventive Care list are included within your After Visit Summary for your review. Other Preventive Recommendations:    · A preventive eye exam performed by an eye specialist is recommended every 1-2 years to screen for glaucoma; cataracts, macular degeneration, and other eye disorders. · A preventive dental visit is recommended every 6 months. · Try to get at least 150 minutes of exercise per week or 10,000 steps per day on a pedometer . · Order or download the FREE \"Exercise & Physical Activity: Your Everyday Guide\" from [employer]. Call 3-180.872.1098 or search [employer] online. · You need 3454-8367 mg of calcium and 0419-3631 IU of vitamin D per day. It is possible to meet your calcium requirement with diet alone, but a vitamin D supplement is usually necessary to meet this goal.  · When exposed to the sun, use a sunscreen that protects against both UVA and UVB radiation with an SPF of 30 or greater. Reapply every 2 to 3 hours or after sweating, drying off with a towel, or swimming. · Always wear a seat belt when traveling in a car. Always wear a helmet when riding a bicycle or motorcycle.

## 2020-09-08 DIAGNOSIS — K52.9 CHRONIC DIARRHEA: ICD-10-CM

## 2020-09-08 DIAGNOSIS — I25.10 CORONARY ARTERY DISEASE INVOLVING NATIVE CORONARY ARTERY OF NATIVE HEART WITHOUT ANGINA PECTORIS: ICD-10-CM

## 2020-09-08 DIAGNOSIS — I10 ESSENTIAL HYPERTENSION: ICD-10-CM

## 2020-09-08 DIAGNOSIS — E78.5 HYPERLIPIDEMIA WITH TARGET LDL LESS THAN 100: ICD-10-CM

## 2020-09-08 LAB
A/G RATIO: 2.2 (ref 1.1–2.2)
ALBUMIN SERPL-MCNC: 4.2 G/DL (ref 3.4–5)
ALP BLD-CCNC: 59 U/L (ref 40–129)
ALT SERPL-CCNC: 10 U/L (ref 10–40)
ANION GAP SERPL CALCULATED.3IONS-SCNC: 12 MMOL/L (ref 3–16)
AST SERPL-CCNC: 17 U/L (ref 15–37)
BASOPHILS ABSOLUTE: 0.1 K/UL (ref 0–0.2)
BASOPHILS RELATIVE PERCENT: 0.8 %
BILIRUB SERPL-MCNC: 0.5 MG/DL (ref 0–1)
BUN BLDV-MCNC: 14 MG/DL (ref 7–20)
CALCIUM SERPL-MCNC: 9 MG/DL (ref 8.3–10.6)
CHLORIDE BLD-SCNC: 104 MMOL/L (ref 99–110)
CHOLESTEROL, TOTAL: 267 MG/DL (ref 0–199)
CO2: 21 MMOL/L (ref 21–32)
CREAT SERPL-MCNC: 1.3 MG/DL (ref 0.6–1.2)
EOSINOPHILS ABSOLUTE: 0.3 K/UL (ref 0–0.6)
EOSINOPHILS RELATIVE PERCENT: 3.7 %
GFR AFRICAN AMERICAN: 48
GFR NON-AFRICAN AMERICAN: 39
GLOBULIN: 1.9 G/DL
GLUCOSE BLD-MCNC: 102 MG/DL (ref 70–99)
HCT VFR BLD CALC: 39.7 % (ref 36–48)
HDLC SERPL-MCNC: 47 MG/DL (ref 40–60)
HEMOGLOBIN: 13.4 G/DL (ref 12–16)
LDL CHOLESTEROL CALCULATED: 179 MG/DL
LYMPHOCYTES ABSOLUTE: 1.7 K/UL (ref 1–5.1)
LYMPHOCYTES RELATIVE PERCENT: 23 %
MCH RBC QN AUTO: 32.8 PG (ref 26–34)
MCHC RBC AUTO-ENTMCNC: 33.7 G/DL (ref 31–36)
MCV RBC AUTO: 97.1 FL (ref 80–100)
MONOCYTES ABSOLUTE: 0.5 K/UL (ref 0–1.3)
MONOCYTES RELATIVE PERCENT: 7.1 %
NEUTROPHILS ABSOLUTE: 4.9 K/UL (ref 1.7–7.7)
NEUTROPHILS RELATIVE PERCENT: 65.4 %
PDW BLD-RTO: 14.6 % (ref 12.4–15.4)
PLATELET # BLD: 207 K/UL (ref 135–450)
PMV BLD AUTO: 8.4 FL (ref 5–10.5)
POTASSIUM SERPL-SCNC: 4.6 MMOL/L (ref 3.5–5.1)
RBC # BLD: 4.09 M/UL (ref 4–5.2)
SODIUM BLD-SCNC: 137 MMOL/L (ref 136–145)
TOTAL PROTEIN: 6.1 G/DL (ref 6.4–8.2)
TRIGL SERPL-MCNC: 205 MG/DL (ref 0–150)
VLDLC SERPL CALC-MCNC: 41 MG/DL
WBC # BLD: 7.5 K/UL (ref 4–11)

## 2020-09-09 ENCOUNTER — TELEPHONE (OUTPATIENT)
Dept: PHARMACY | Facility: CLINIC | Age: 80
End: 2020-09-09

## 2020-09-09 NOTE — TELEPHONE ENCOUNTER
CLINICAL PHARMACY: ADHERENCE REVIEW  Identified care gap per [facility]; fills at [facility]: Statin adherence    Last Office Visit: 9/4/2020    ASSESSMENT  STATIN ADHERENCE  PDC: 0.77    Per Insurance Records   Pravastatin 40 mg tablets last filled on 5/29/2020 for a 90 day supply; SIG: Take 1 tablet PO daily; Billed through [facility]. Lab Results   Component Value Date    CHOL 267 (H) 09/08/2020    TRIG 205 (H) 09/08/2020    HDL 47 09/08/2020    LDLCALC 179 (H) 09/08/2020     ALT   Date Value Ref Range Status   09/08/2020 10 10 - 40 U/L Final     AST   Date Value Ref Range Status   09/08/2020 17 15 - 37 U/L Final     The 10-year ASCVD risk score (Cheryl Arriaga, et al., 2013) is: 50.1%    Values used to calculate the score:      Age: 78 years      Sex: Female      Is Non- : No      Diabetic: No      Tobacco smoker: Yes      Systolic Blood Pressure: 866 mmHg      Is BP treated: Yes      HDL Cholesterol: 47 mg/dL      Total Cholesterol: 267 mg/dL     PLAN  Reached patient to review. Patient admitted to not taking pravastatin every day due to \"not feeling well\". She feels sick to her stomach when she takes the medication and has never told PCP. She denies muscle pain related to pravastatin, says she has had severe arthritis for years. She is going to call PCP about this today to see if a medication or dose change is necessary. She does not currently use pill box but expressed interest when I explained how it may help. She does not wish for me to refill pravastatin at this time due to not taking the medication every day as prescribed.     Jonatan Galaviz, [id number] PharmD Candidate

## 2020-09-10 ENCOUNTER — TELEPHONE (OUTPATIENT)
Dept: FAMILY MEDICINE CLINIC | Age: 80
End: 2020-09-10

## 2020-09-14 RX ORDER — PRAVASTATIN SODIUM 80 MG/1
80 TABLET ORAL DAILY
Qty: 90 TABLET | Refills: 1 | Status: SHIPPED | OUTPATIENT
Start: 2020-09-14 | End: 2020-09-29

## 2020-09-15 NOTE — TELEPHONE ENCOUNTER
Dr. Ondina Washington,    Patient was outreached to discuss her pravastatin adherence. Note dose was increased yesterday from 40 mg to 80 mg due to recent lab work. She states that the past month she has probably only taking the pravastatin once per week. She said she thinks that it was causing her stomach pains. Recommended that she eat when she takes the pravastatin to see if this helps. Cindy Lau states she will try eating with pravastatin to see if helps stomach, but actually wants to try a different statin. Note patient on atorvastatin in 2018 and stopped because \"making her sick. \" Would recommend rosuvastatin 20 mg daily with food. Please let me know if I can perform any further outreach. Thank you,  Marium Aaron, PharmD, [address] [place] [address] Road: [phone]  Department, toll free: [phone], option 7   =========================================================    Cindy Lau returned call. She states that the past month she has probably only taking the pravastatin once per week. She said she thinks that it was causing her stomach pains- states she tried taking it different times of the day and it didn't matter what time of day she took it, she noticed stomach pains. Denies stomach pain on days doesn't take pravastatin. Patient states she never eats when she takes her medication, recommended that she eat when she takes the pravastatin to see if this helps. Cindy Lua states she will try eating with pravastatin to see if helps stomach, but actually wants to try a different statin. Note patient on atorvastatin in 2018 and stopped because \"making her sick. \" Will recommend rosuvastatin 20 mg and eating with that. CLINICAL PHARMACY CONSULT: MED RECONCILIATION/REVIEW ADDENDUM  For Pharmacy Admin Tracking Only  PHSO: Yes  Total # of Interventions Recommended: 1  - New Order #: 1 New Medication Order Reason(s):  Adherence, Patient Preference  - Refills Provided #: 1  - Maintenance Safety Lab Monitoring #: 1  Recommended intervention potential cost savings: 1  Total Interventions Accepted: 1  Time Spent (min): [address]

## 2020-09-15 NOTE — TELEPHONE ENCOUNTER
Would like to reach patient to discuss pravastatin adherence. Per note with intern, patient not taking daily as prescribed due to \"sick to stomach. \" Note this medication has been on patient's med list since 2011. Also note pravastatin dose was increased yesterday from 40 to 80 mg daily due to recent lipid panel results. Left message for patient to return call.

## 2020-09-16 RX ORDER — ROSUVASTATIN CALCIUM 20 MG/1
20 TABLET, COATED ORAL DAILY
Qty: 30 TABLET | Refills: 5 | Status: SHIPPED | OUTPATIENT
Start: 2020-09-16 | End: 2022-05-31

## 2020-09-16 NOTE — TELEPHONE ENCOUNTER
Please tell patient that we can change her statin to see if she tolerates it better. Take with food. Please schedule check-up in 2 months FASTING.

## 2020-09-30 ENCOUNTER — OFFICE VISIT (OUTPATIENT)
Dept: PRIMARY CARE CLINIC | Age: 80
End: 2020-09-30
Payer: MEDICARE

## 2020-09-30 PROCEDURE — G8428 CUR MEDS NOT DOCUMENT: HCPCS | Performed by: NURSE PRACTITIONER

## 2020-09-30 PROCEDURE — 99211 OFF/OP EST MAY X REQ PHY/QHP: CPT | Performed by: NURSE PRACTITIONER

## 2020-09-30 PROCEDURE — G8417 CALC BMI ABV UP PARAM F/U: HCPCS | Performed by: NURSE PRACTITIONER

## 2020-09-30 NOTE — PROGRESS NOTES
Patient presented to [facility] drive up clinic for preop testing. Patient was swabbed and given information advising them to remain isolated until procedure date.

## 2020-10-02 LAB — SARS-COV-2, NAA: NOT DETECTED

## 2021-01-29 ENCOUNTER — TELEPHONE (OUTPATIENT)
Dept: PHARMACY | Facility: CLINIC | Age: 81
End: 2021-01-29

## 2021-01-29 NOTE — TELEPHONE ENCOUNTER
POPULATION HEALTH CLINICAL PHARMACY REVIEW: ADHERENCE REVIEW  Identified care gap per Turner; fills at [facility]: Statin adherence    Last Office Visit: 9/4/20    Pt is LIS 1 and can get 90 day supply of medication for same copay as 30 day supply    ASSESSMENT  STATIN ADHERENCE  PDC: 67% in 2020    See 9/9/20 pharmacy telephone encounter, changed from pravastatin to rosuvastatin due to stomach pains    Per Reconciled Dispense Report   Pravastatin 40 mg tabs  PRAVASTATIN SODIUM  40 MG TABS 09/08/2020 90 90 Device Viraj Pancoast [place] [address] . .. PRAVASTATIN SODIUM  40 MG TABS 05/29/2020 90 90 Device JOHANNA,KELLENE KROGER [place] [address] . .. PRAVASTATIN SODIUM  40 MG TABS 01/08/2020 90 90 Device JOHANNA,KELLENE KROGER [place] [address] . .. Rosuvastatin 20 mg tabs  ROSUVASTATIN CALCIUM  20 MG TABS 09/16/2020 30 30 Device JOHANNA,KELLENE KROGER [place] [address] . ..   - per insurance report appears also filled 10/24/20 x 30 ds? Lab Results   Component Value Date    CHOL 267 (H) 09/08/2020    TRIG 205 (H) 09/08/2020    HDL 47 09/08/2020    LDLCALC 179 (H) 09/08/2020     ALT   Date Value Ref Range Status   09/08/2020 10 10 - 40 U/L Final     AST   Date Value Ref Range Status   09/08/2020 17 15 - 37 U/L Final     The ASCVD Risk score (Flip White, et al., 2013) failed to calculate for the following reasons: The 2013 ASCVD risk score is only valid for ages 36 to 78     PLAN  Reached patient to review (her friend was also on phone per pt OK). Pt states she stopped taking the rosuvastatin about 2 weeks ago. When she takes the medication she does not feel well - \"just do not feel well, feel sick to my stomach. Feels better after stopping. Was not taking with meal and not taking any consistent time of day.  Pt stated she believes she tolerated a particular statin before she was changed, she had stopped at one point because a co-worker had made a comment that \"statins were not good\"; subsequently she stopped and her cholesterol increased. Has had trouble finding statin she could tolerate after that. She seemed to think she could tolerate whatever medication she was on about 3 years ago was the one she could tolerate. We reviewed her statin history. Atorvastatin and ezetimibe-simvastatin on allergy list; pt has also tried rosuvastatin and pravastatin without tolerating recently per pharmacy notes. We discussed a few options and ultimately pt decided she prefers to retry rosuvastatin 20 mg daily as prescribed but she will start taking with a meal at a consistent time of day; edu it is OK if she takes with other medications (seems that she was trying to space them out). If unable to tolerate, consider decreasing dose or trying another statin? (discussed that could try lovastatin?), or alternate day statin? Pt appreciated outreach and said she would call us back if she has any troubles or further questions. If able to tolerate statin, will suggest Rx for 90 day supply given BRETT Onofre, PharmD, [facility]  Department, toll free: [phone], option 7     For Pharmacy Admin Tracking Only    PHSO: Yes  Total # of Interventions Recommended: 1  - New Order #: 1 New Medication Order Reason(s): Adherence  - Refills Provided #: 0  - Updated Order #: 0 Updated Order Reason(s):  Other  - New Therapy Lab Monitoring #: 1  Recommended intervention potential cost savings: 1  Total Interventions Accepted: 1  Time Spent (min): 30

## 2021-02-02 DIAGNOSIS — I50.22 CHRONIC SYSTOLIC CONGESTIVE HEART FAILURE (HCC): ICD-10-CM

## 2021-02-02 DIAGNOSIS — I10 ESSENTIAL HYPERTENSION: ICD-10-CM

## 2021-02-03 RX ORDER — CARVEDILOL 12.5 MG/1
TABLET ORAL
Qty: 180 TABLET | Refills: 0 | Status: SHIPPED | OUTPATIENT
Start: 2021-02-03 | End: 2021-05-04 | Stop reason: SDUPTHER

## 2021-04-22 ENCOUNTER — VIRTUAL VISIT (OUTPATIENT)
Dept: FAMILY MEDICINE CLINIC | Age: 81
End: 2021-04-22
Payer: MEDICARE

## 2021-04-22 DIAGNOSIS — F41.9 ANXIETY: ICD-10-CM

## 2021-04-22 DIAGNOSIS — K52.9 CHRONIC DIARRHEA: Primary | ICD-10-CM

## 2021-04-22 DIAGNOSIS — F32.4 MAJOR DEPRESSIVE DISORDER WITH SINGLE EPISODE, IN PARTIAL REMISSION (HCC): ICD-10-CM

## 2021-04-22 PROCEDURE — 99442 PR PHYS/QHP TELEPHONE EVALUATION 11-20 MIN: CPT | Performed by: FAMILY MEDICINE

## 2021-04-22 RX ORDER — BUPROPION HYDROCHLORIDE 150 MG/1
150 TABLET ORAL EVERY MORNING
Qty: 30 TABLET | Refills: 1 | Status: SHIPPED | OUTPATIENT
Start: 2021-04-22 | End: 2021-07-30

## 2021-04-22 RX ORDER — NORTRIPTYLINE HYDROCHLORIDE 10 MG/1
10 CAPSULE ORAL NIGHTLY
Qty: 30 CAPSULE | Refills: 3 | Status: SHIPPED | OUTPATIENT
Start: 2021-04-22 | End: 2022-05-31

## 2021-04-22 NOTE — PROGRESS NOTES
PHONE VISIT    Ubaldo Hansen is a [de-identified] y.o. female evaluated via telephone on 4/22/2021. Consent:  She and/or health care decision maker is aware that that she may receive a bill for this telephone service, depending on her insurance coverage, and has provided verbal consent to proceed: Yes    I affirm this is a Patient Initiated Episode with an Established Patient who has not had a related appointment within my department in the past 7 days or scheduled within the next 24 hours. PROBLEM VISIT NOTE     Subjective:     Chief Complaint   Patient presents with    Diarrhea     Ubaldo Hansen is a [de-identified] y.o. female   who presents diarrhea, pt gets it in the morning, she gets it really bad but by late afternoon. She was afraid to come in today because of it. She has been taking metamucil to see if that helps. No blood. Nl stool in between then runny stool. Duration diarrhea as been off and on about a year. Had vomiting with bowel prep   · She is also feeling depressed because she can't go anywhere as she can't drive anymore. She has a fear of going out because she hasn't had her covid shots yet. ·  Pt gets body aches after taking crestor, took pravastatin before and that didn't bother her. Patient's medications, allergies, past medical, and social histories were reviewed and updated as appropriate (see below). Review of Systems   General ROS: fever? No,    Night sweats? No  Endocrine ROS:malaise/lethargy? No   Unexpected weight changes? No  Respiratory ROS: cough? No   Shortness of breath? No  Cardiovascular ROS:chest pain? No   Shortness of breath with exertion? No  Gastrointestinal ROS: abdominal pain? No   Change in stools? No  Genito-Urinary ROS: painful urination? No   Trouble voiding? No  Neurological ROS: TIA or stroke symptoms? No   Numbness/tingling in feet?  No    Health Maintenance Due   Topic Date Due    COVID-19 Vaccine (1) Never done    DTaP/Tdap/Td vaccine (1 - Tdap) 12/16/1959    Shingles Vaccine (1 of 2) Never done     Documentation provided by medical assistant reviewed and updated by provider. HISTORY:  Patient's medications, allergies, past medical, and social histories were reviewed and updated as appropriate. CHART REVIEW  Health Maintenance   Topic Date Due    COVID-19 Vaccine (1) Never done    DTaP/Tdap/Td vaccine (1 - Tdap) 12/16/1959    Shingles Vaccine (1 of 2) Never done    Flu vaccine (Season Ended) 09/01/2021    Annual Wellness Visit (AWV)  09/05/2021    Lipid screen  09/08/2021    Potassium monitoring  09/08/2021    Creatinine monitoring  09/08/2021    DEXA (modify frequency per FRAX score)  Completed    Pneumococcal 65+ years Vaccine  Completed    Hepatitis A vaccine  Aged Out    Hepatitis B vaccine  Aged Out    Hib vaccine  Aged Out    Meningococcal (ACWY) vaccine  Aged Out     The ASCVD Risk score (Malik Cárdenas., et al., 2013) failed to calculate for the following reasons: The 2013 ASCVD risk score is only valid for ages 36 to 78  Prior to Visit Medications    Medication Sig Taking? Authorizing Provider   nortriptyline (PAMELOR) 10 MG capsule Take 1 capsule by mouth nightly Yes Lee Bo MD   buPROPion (WELLBUTRIN XL) 150 MG extended release tablet Take 1 tablet by mouth every morning Yes Lee Bo MD   carvedilol (COREG) 12.5 MG tablet TAKE ONE TABLET BY MOUTH TWICE A DAY WITH MEALS Yes Lee Bo MD   rosuvastatin (CRESTOR) 20 MG tablet Take 1 tablet by mouth daily Yes Lee Bo MD   sertraline (ZOLOFT) 100 MG tablet TAKE ONE TABLET BY MOUTH DAILY Yes Lee Bo MD   aspirin EC 81 MG EC tablet Take 81 mg by mouth daily. Yes Historical Provider, MD   famotidine (PEPCID) 20 MG tablet Take 20 mg by mouth 2 times daily. Yes Historical Provider, MD   acetaminophen (TYLENOL ARTHRITIS PAIN) 650 MG CR tablet Take 650 mg by mouth every 8 hours as needed.    Yes Historical Provider, MD   furosemide (LASIX) 20 MG tablet Take 1 tablet by mouth daily for 7 days  Patient not taking: Reported on 4/22/2021  Daniela Bower MD      Family History   Problem Relation Age of Onset    Ulcerative Colitis Brother      Social History     Tobacco Use    Smoking status: Current Every Day Smoker     Packs/day: 0.25     Years: 25.00     Pack years: 6.25     Types: Cigarettes    Smokeless tobacco: Never Used    Tobacco comment: advised to quit, reducing some   Substance Use Topics    Alcohol use: No     Alcohol/week: 0.0 standard drinks     Comment: recovering alcoholic- sober since 17/3302    Drug use: No     Comment: Hx fiorcet abuse      LAST LABS  Cholesterol, Total   Date Value Ref Range Status   09/08/2020 267 (H) 0 - 199 mg/dL Final     LDL Calculated   Date Value Ref Range Status   09/08/2020 179 (H) <100 mg/dL Final     HDL   Date Value Ref Range Status   09/08/2020 47 40 - 60 mg/dL Final   09/22/2011 53 40 - 60 mg/dl Final     Triglycerides   Date Value Ref Range Status   09/08/2020 205 (H) 0 - 150 mg/dL Final     Lab Results   Component Value Date    GLUCOSE 102 (H) 09/08/2020     Lab Results   Component Value Date     09/08/2020    K 4.6 09/08/2020    CREATININE 1.3 (H) 09/08/2020     Lab Results   Component Value Date    WBC 7.5 09/08/2020    HGB 13.4 09/08/2020    HCT 39.7 09/08/2020    MCV 97.1 09/08/2020     09/08/2020     Lab Results   Component Value Date    ALT 10 09/08/2020    AST 17 09/08/2020    ALKPHOS 59 09/08/2020    BILITOT 0.5 09/08/2020     TSH (uIU/mL)   Date Value   09/13/2018 1.68     No results found for: LABA1C   Assessment and Plan:      Diagnosis Orders   1. Chronic diarrhea -uncontrolled nortriptyline (PAMELOR) 10 MG capsule   2. Major depressive disorder with single episode, in partial remission - could be better    3. Anxiety - new      INSTRUCTIONS  NEXT APPOINTMENT: Please schedule check-up in 1 month. · Continue metamucil. · Add pamelor. · Get COVID vaccines. · Get colonoscopy scheduled again.     I affirm this is a Patient Initiated Episode with an Established Patient who has not had a related appointment within my department in the past 7 days or scheduled within the next 24 hours.     Total Time: minutes: 11-20 minutes    Note: not billable if this call serves to triage the patient into an appointment for the relevant concern    SVETA LETICIA [facility]

## 2021-05-04 DIAGNOSIS — I50.22 CHRONIC SYSTOLIC CONGESTIVE HEART FAILURE (HCC): ICD-10-CM

## 2021-05-04 DIAGNOSIS — I10 ESSENTIAL HYPERTENSION: ICD-10-CM

## 2021-05-04 RX ORDER — CARVEDILOL 12.5 MG/1
TABLET ORAL
Qty: 180 TABLET | Refills: 0 | Status: SHIPPED | OUTPATIENT
Start: 2021-05-04 | End: 2021-07-30 | Stop reason: SDUPTHER

## 2021-06-02 ENCOUNTER — CLINICAL DOCUMENTATION (OUTPATIENT)
Dept: OTHER | Age: 81
End: 2021-06-02

## 2021-07-14 ENCOUNTER — TELEPHONE (OUTPATIENT)
Dept: FAMILY MEDICINE CLINIC | Age: 81
End: 2021-07-14

## 2021-07-14 DIAGNOSIS — F32.5 MAJOR DEPRESSIVE DISORDER WITH SINGLE EPISODE, IN FULL REMISSION (HCC): ICD-10-CM

## 2021-07-15 NOTE — TELEPHONE ENCOUNTER
LM FOR PATIENT THAT SHE MUST SCHEDULE APPOINTMENT BEFORE REFILLS WILL BE GIVEN.      PLEASE TRY AGAIN AND SEND BACK FOR REFILL WHEN SCHEDULED

## 2021-07-19 RX ORDER — SERTRALINE HYDROCHLORIDE 100 MG/1
TABLET, FILM COATED ORAL
Qty: 30 TABLET | Refills: 0 | Status: SHIPPED | OUTPATIENT
Start: 2021-07-19 | End: 2021-07-23 | Stop reason: SDUPTHER

## 2021-07-22 NOTE — TELEPHONE ENCOUNTER
Pt had an appointment on July 21 but she couldn't make it. She rescheduled for July 30. Can her medication for sertraline be refilled?     Please Advise

## 2021-07-23 RX ORDER — SERTRALINE HYDROCHLORIDE 100 MG/1
TABLET, FILM COATED ORAL
Qty: 30 TABLET | Refills: 0 | Status: SHIPPED | OUTPATIENT
Start: 2021-07-23 | End: 2021-09-02 | Stop reason: SDUPTHER

## 2021-07-30 ENCOUNTER — VIRTUAL VISIT (OUTPATIENT)
Dept: FAMILY MEDICINE CLINIC | Age: 81
End: 2021-07-30
Payer: MEDICARE

## 2021-07-30 DIAGNOSIS — E78.5 HYPERLIPIDEMIA WITH TARGET LDL LESS THAN 100: ICD-10-CM

## 2021-07-30 DIAGNOSIS — Z72.0 TOBACCO ABUSE: ICD-10-CM

## 2021-07-30 DIAGNOSIS — J44.9 CHRONIC OBSTRUCTIVE PULMONARY DISEASE, UNSPECIFIED COPD TYPE (HCC): ICD-10-CM

## 2021-07-30 DIAGNOSIS — I25.10 CORONARY ARTERY DISEASE INVOLVING NATIVE CORONARY ARTERY OF NATIVE HEART WITHOUT ANGINA PECTORIS: Primary | ICD-10-CM

## 2021-07-30 DIAGNOSIS — I50.22 CHRONIC SYSTOLIC CONGESTIVE HEART FAILURE (HCC): ICD-10-CM

## 2021-07-30 DIAGNOSIS — I10 ESSENTIAL HYPERTENSION: ICD-10-CM

## 2021-07-30 PROCEDURE — 99442 PR PHYS/QHP TELEPHONE EVALUATION 11-20 MIN: CPT | Performed by: FAMILY MEDICINE

## 2021-07-30 RX ORDER — CARVEDILOL 12.5 MG/1
TABLET ORAL
Qty: 180 TABLET | Refills: 0 | Status: SHIPPED | OUTPATIENT
Start: 2021-07-30 | End: 2021-10-29 | Stop reason: SDUPTHER

## 2021-07-30 NOTE — PROGRESS NOTES
PHONE VISIT    Digna Turk is a [de-identified] y.o. female evaluated via telephone on 2021. Consent:  She and/or health care decision maker is aware that that she may receive a bill for this telephone service, depending on her insurance coverage, and has provided verbal consent to proceed: Yes    I affirm this is a Patient Initiated Episode with an Established Patient who has not had a related appointment within my department in the past 7 days or scheduled within the next 24 hours. CARDIOVASCULAR VISIT NOTE   Subjective:   HPI CHRONIC:   Chief Complaint   Patient presents with    Hypertension      Patient here for follow-up of multiple chronic conditions includin. Coronary artery disease involving native coronary artery of native heart without angina pectoris    2. Chronic obstructive pulmonary disease, unspecified COPD type ([facility] [address]    3. Chronic systolic congestive heart failure ([facility] [address]    4. Hyperlipidemia with target LDL less than 100    5. Tobacco abuse    6. Chronic systolic congestive heart failure ([facility] [address]- EF 30-35% 2016    7. Essential hypertension      Complaints: pt states her arthritis in her back is really bad. She takes tylenol and that helps a little. Can only stand 30 minutes at a time but OK after sit 5 minutes. · she has had really bad diahrrea but only in the mornings. She doesn't have it in the evening. She has had the diarrhea on and off for a year and a half. Worse day after eating vegetables and fruit. May be nerves. Last colon canceled due to vomiting the prep. No blood. · Not like to leave home due to COVID  · Did not tolerate Wellbutrin due to nausea. · Following appropriate diet? No  · Exercise: walking rarely  · Taking medicines daily as directed? Yes  · Any side effects of medications? No    Review of Systems   General ROS: fever? No,    Night sweats? No  Endocrine ROS: fatigue? No   Unexpected weight changes? No  Hem/Lymph ROS: easy bruising?   No   Swollen lymph nodes? No  Respiratory ROS: cough? No   Wheezing? No  Cardiovascular ROS: chest pain? No   Shortness of breath? No  Neurological ROS: TIA or stroke symptoms? No    Health Maintenance Due   Topic Date Due    Shingles Vaccine (1 of 2) Never done    DTaP/Tdap/Td vaccine (1 - Tdap) 08/04/2010     Documentation provided by medical assistant reviewed and updated by provider. HISTORY:  Patient's medications, allergies, past medical, and social histories were reviewed and updated as appropriate. CHART REVIEW  Health Maintenance   Topic Date Due    Shingles Vaccine (1 of 2) Never done    DTaP/Tdap/Td vaccine (1 - Tdap) 08/04/2010    Flu vaccine (1) 09/01/2021    Annual Wellness Visit (AWV)  09/05/2021    Lipid screen  09/08/2021    Potassium monitoring  09/08/2021    Creatinine monitoring  09/08/2021    DEXA (modify frequency per FRAX score)  Completed    Pneumococcal 65+ years Vaccine  Completed    COVID-19 Vaccine  Completed    Hepatitis A vaccine  Aged Out    Hepatitis B vaccine  Aged Out    Hib vaccine  Aged Out    Meningococcal (ACWY) vaccine  Aged Out     The ASCVD Risk score (Nacho Smallwood, et al., 2013) failed to calculate for the following reasons: The 2013 ASCVD risk score is only valid for ages 36 to 78  Prior to Visit Medications    Medication Sig Taking? Authorizing Provider   carvedilol (COREG) 12.5 MG tablet TAKE ONE TABLET BY MOUTH TWICE A DAY WITH MEALS Yes Johnnie Rios MD   sertraline (ZOLOFT) 100 MG tablet TAKE ONE TABLET BY MOUTH DAILY. Last refill without office visit. Yes Johnnie Rios MD   nortriptyline (PAMELOR) 10 MG capsule Take 1 capsule by mouth nightly Yes Johnnie Rios MD   rosuvastatin (CRESTOR) 20 MG tablet Take 1 tablet by mouth daily Yes Johnnie Rios MD   furosemide (LASIX) 20 MG tablet Take 1 tablet by mouth daily for 7 days Yes Johnnie Rios MD   aspirin EC 81 MG EC tablet Take 81 mg by mouth daily.    Yes Historical Provider, MD   famotidine (PEPCID) 20 MG tablet Take 20 mg by mouth 2 times daily. Yes Historical Provider, MD   acetaminophen (TYLENOL ARTHRITIS PAIN) 650 MG CR tablet Take 650 mg by mouth every 8 hours as needed. Yes Historical Provider, MD      Family History   Problem Relation Age of Onset    Ulcerative Colitis Brother      Social History     Tobacco Use    Smoking status: Current Every Day Smoker     Packs/day: 0.25     Years: 25.00     Pack years: 6.25     Types: Cigarettes    Smokeless tobacco: Never Used    Tobacco comment: advised to quit, reducing some   Vaping Use    Vaping Use: Never used   Substance Use Topics    Alcohol use: No     Alcohol/week: 0.0 standard drinks     Comment: recovering alcoholic- sober since 95/6394    Drug use: No     Comment: Hx fiorcet abuse      LAST LABS  Cholesterol, Total   Date Value Ref Range Status   09/08/2020 267 (H) 0 - 199 mg/dL Final     LDL Calculated   Date Value Ref Range Status   09/08/2020 179 (H) <100 mg/dL Final     HDL   Date Value Ref Range Status   09/08/2020 47 40 - 60 mg/dL Final   09/22/2011 53 40 - 60 mg/dl Final     Triglycerides   Date Value Ref Range Status   09/08/2020 205 (H) 0 - 150 mg/dL Final     Lab Results   Component Value Date    GLUCOSE 102 (H) 09/08/2020     Lab Results   Component Value Date     09/08/2020    K 4.6 09/08/2020    CREATININE 1.3 (H) 09/08/2020     Lab Results   Component Value Date    WBC 7.5 09/08/2020    HGB 13.4 09/08/2020    HCT 39.7 09/08/2020    MCV 97.1 09/08/2020     09/08/2020     Lab Results   Component Value Date    ALT 10 09/08/2020    AST 17 09/08/2020    ALKPHOS 59 09/08/2020    BILITOT 0.5 09/08/2020     TSH (uIU/mL)   Date Value   09/13/2018 1.68     No results found for: LABA1C   Assessment and Plan:      Diagnosis Orders   1. Coronary artery disease involving native coronary artery of native heart without angina pectoris     2. Chronic obstructive pulmonary disease, unspecified COPD type ([facility] [address]     3.  Chronic systolic congestive heart failure ([facility] [address]     4. Hyperlipidemia with target LDL less than 100     5. Tobacco abuse     6. [facility]- EF 30-35% 12/2016  carvedilol (COREG) 12.5 MG tablet   7. Essential hypertension  carvedilol (COREG) 12.5 MG tablet   Stable with current medications. No adjustments needed. Will continue to monitor. INSTRUCTIONS  NEXT APPOINTMENT: Please schedule fasting annual physical (30 minutes) in 2 months. OK to have water and medications (except for diabetes medicines). Reschedule the colonoscopy. Ask about a different prep. I affirm this is a Patient Initiated Episode with an Established Patient who has not had a related appointment within my department in the past 7 days or scheduled within the next 24 hours.     Total Time: minutes: 11-20 minutes    Note: not billable if this call serves to triage the patient into an appointment for the relevant concern    Norma Melgar MD

## 2021-08-02 ENCOUNTER — TELEPHONE (OUTPATIENT)
Dept: FAMILY MEDICINE CLINIC | Age: 81
End: 2021-08-02

## 2021-09-02 ENCOUNTER — TELEPHONE (OUTPATIENT)
Dept: FAMILY MEDICINE CLINIC | Age: 81
End: 2021-09-02

## 2021-09-02 DIAGNOSIS — F32.5 MAJOR DEPRESSIVE DISORDER WITH SINGLE EPISODE, IN FULL REMISSION (HCC): ICD-10-CM

## 2021-09-02 RX ORDER — SERTRALINE HYDROCHLORIDE 100 MG/1
TABLET, FILM COATED ORAL
Qty: 30 TABLET | Refills: 1 | Status: SHIPPED | OUTPATIENT
Start: 2021-09-02 | End: 2021-12-02 | Stop reason: SDUPTHER

## 2021-10-06 ENCOUNTER — OFFICE VISIT (OUTPATIENT)
Dept: FAMILY MEDICINE CLINIC | Age: 81
End: 2021-10-06
Payer: MEDICARE

## 2021-10-06 VITALS
DIASTOLIC BLOOD PRESSURE: 76 MMHG | OXYGEN SATURATION: 95 % | WEIGHT: 167 LBS | BODY MASS INDEX: 26.84 KG/M2 | HEART RATE: 81 BPM | SYSTOLIC BLOOD PRESSURE: 126 MMHG | HEIGHT: 66 IN

## 2021-10-06 DIAGNOSIS — J44.9 CHRONIC OBSTRUCTIVE PULMONARY DISEASE, UNSPECIFIED COPD TYPE (HCC): ICD-10-CM

## 2021-10-06 DIAGNOSIS — I50.22 CHRONIC SYSTOLIC CONGESTIVE HEART FAILURE (HCC): ICD-10-CM

## 2021-10-06 DIAGNOSIS — F32.5 MAJOR DEPRESSIVE DISORDER WITH SINGLE EPISODE, IN FULL REMISSION (HCC): ICD-10-CM

## 2021-10-06 DIAGNOSIS — Z72.0 TOBACCO ABUSE: ICD-10-CM

## 2021-10-06 DIAGNOSIS — E78.5 HYPERLIPIDEMIA WITH TARGET LDL LESS THAN 100: ICD-10-CM

## 2021-10-06 DIAGNOSIS — Z00.00 ROUTINE GENERAL MEDICAL EXAMINATION AT A HEALTH CARE FACILITY: Primary | ICD-10-CM

## 2021-10-06 DIAGNOSIS — R29.6 FREQUENT FALLS: ICD-10-CM

## 2021-10-06 DIAGNOSIS — Z23 NEEDS FLU SHOT: ICD-10-CM

## 2021-10-06 DIAGNOSIS — I25.10 CORONARY ARTERY DISEASE INVOLVING NATIVE CORONARY ARTERY OF NATIVE HEART WITHOUT ANGINA PECTORIS: ICD-10-CM

## 2021-10-06 DIAGNOSIS — I10 ESSENTIAL HYPERTENSION: ICD-10-CM

## 2021-10-06 PROBLEM — B02.9 HERPES ZOSTER WITHOUT COMPLICATION: Status: RESOLVED | Noted: 2018-11-28 | Resolved: 2021-10-06

## 2021-10-06 PROCEDURE — G0439 PPPS, SUBSEQ VISIT: HCPCS | Performed by: FAMILY MEDICINE

## 2021-10-06 PROCEDURE — 4040F PNEUMOC VAC/ADMIN/RCVD: CPT | Performed by: FAMILY MEDICINE

## 2021-10-06 PROCEDURE — G0008 ADMIN INFLUENZA VIRUS VAC: HCPCS | Performed by: FAMILY MEDICINE

## 2021-10-06 PROCEDURE — 90694 VACC AIIV4 NO PRSRV 0.5ML IM: CPT | Performed by: FAMILY MEDICINE

## 2021-10-06 PROCEDURE — G8484 FLU IMMUNIZE NO ADMIN: HCPCS | Performed by: FAMILY MEDICINE

## 2021-10-06 PROCEDURE — 1123F ACP DISCUSS/DSCN MKR DOCD: CPT | Performed by: FAMILY MEDICINE

## 2021-10-06 ASSESSMENT — PATIENT HEALTH QUESTIONNAIRE - PHQ9
SUM OF ALL RESPONSES TO PHQ QUESTIONS 1-9: 2
1. LITTLE INTEREST OR PLEASURE IN DOING THINGS: 1
SUM OF ALL RESPONSES TO PHQ QUESTIONS 1-9: 2
2. FEELING DOWN, DEPRESSED OR HOPELESS: 1
SUM OF ALL RESPONSES TO PHQ QUESTIONS 1-9: 2
SUM OF ALL RESPONSES TO PHQ9 QUESTIONS 1 & 2: 2

## 2021-10-06 ASSESSMENT — LIFESTYLE VARIABLES: HOW OFTEN DO YOU HAVE A DRINK CONTAINING ALCOHOL: 0

## 2021-10-06 NOTE — PATIENT INSTRUCTIONS
treatment you would want depending on how sick you are. For example, the directives would describe what kind of care you want if you have an illness that you are unlikely to recover from, or if you are permanently unconscious. Advance directives usually tell your doctor that you don't want certain kinds of treatment. However, they can also say that you want a certain treatment no matter how ill you are. Advance directives can take many forms. Laws about advance directives are different in each state. You should be aware of the laws in your state. What is a living will? A living will is one type of advance directive. It is a written, legal document that describes the kind of medical treatments or life-sustaining treatments you would want if you were seriously or terminally ill. A living will doesn't let you select someone to make decisions for you. What is a durable power of  for health care? A durable power of  (DPA) for health care is another kind of advance directive. A DPA states whom you have chosen to make health care decisions for you. It becomes active any time you are unconscious or unable to make medical decisions. A DPA is generally more useful than a living will. But a DPA may not be a good choice if you don't have another person you trust to make these decisions for you. Living torres and DPAs are legal in most states. Even if they aren't officially recognized by the law in your state, they can still guide your loved ones and doctor if you are unable to make decisions about your medical care. Ask your doctor,  or state representative about the law in your state. What is a do not resuscitate order? A do not resuscitate (DNR) order is another kind of advance directive.  A DNR is a request not to have cardiopulmonary resuscitation (CPR) if your heart stops or if you stop breathing. (Unless given other instructions, hospital staff will try to help all patients whose heart has stopped or who have stopped breathing.) You can use an advance directive form or tell your doctor that you don't want to be resuscitated. In this case, a DNR order is put in your medical chart by your doctor. DNR orders are accepted by doctors and hospitals in all states. Should I have an advance directive? By creating an advance directive, you are making your preferences about medical care known before you're faced with a serious injury or illness. This will spare your loved ones the stress of making decisions about your care while you are sick. Any person 25years of age or older can prepare an advance directive. People who are seriously or terminally ill are more likely to have an advance directive. For example, someone who has terminal cancer might write that she does not want to be put on a respirator if she stops breathing. This action can reduce her suffering, increase her peace of mind and increase her control over her death. However, even if you are in good health, you might want to consider writing an advance directive. An accident or serious illness can happen suddenly, and if you already have a signed advance directive, your wishes are more likely to be followed. How can I write an advance directive? You can write an advance directive in several ways:  Use a form provided by your doctor. Write your wishes down by yourself. Call your health department or state department on aging to get a form. Call a . Use a computer [address] for legal documents. Advance directives and living torres do not have to be complicated legal documents. They can be short, simple statements about what you want done or not done if you can't speak for yourself. Remember, anything you write by yourself or with a computer software package should follow your state laws.  You may also want to have what you have written reviewed by your doctor or a  to make sure your directives are understood exactly as you intended. When you are satisfied with your directives, the orders should be notarized if possible, and copies should be given to your family and your doctor. Can I change my advance directive? You may change or cancel your advance directive at any time, as long as you are considered of sound mind to do so. Being of sound mind means that you are still able to think rationally and communicate your wishes in a clear manner. Again, your changesmust be made, signed and notarized according to the laws in your state. Make sure that your doctor and any family members who knew about your directives are also aware that you have changed them. If you do not have time to put your changes in writing, you can make them known while you are in the hospital. Tell your doctor and any family or friends present exactly what you want to happen. Usually, wishes that are made in person will be followed in place of the ones made earlier in writing. Be sure your instructions are clearly understood by everyone you have told. Balance Exercises for Seniors    Everyone from the youngest exercisers to the oldest can benefit from balance training exercises. All functional movements require a working balance system, from getting up out of your chair to walking to dancing to sprinting and everything in between. Good balance prevents injury, improves athletic performance, and eases activities of daily life as you go about your day. Balance exercises are especially important for senior citizens, who sadly experience a high rate of injury and fatality due to declining physical systems, including the balance system. If youre not yet a senior yourself but you have stumbled upon this article, think of your loved ones who may benefit from this information and email this article to them or share on Facebook.  Keep reading to learn why balance exercises are especially important for seniors, as well as how the physiology of balance works in seniors, the biological, behavioral, and environmental aspects of balance in seniors, and effective exercises seniors can do to improve their balance. Before beginning this or any exercise program, be sure to clear it with your doctor. The Importance of Balance Exercises for Seniors  Every year, one in three seniors ages 72 and over experience a fall. Twenty to thirty percent of these falls result in serious injuries, such as lacerations, fractures, and traumatic brain injuries. Even worse, falls are the leading cause of injury-related death in senior citizens. The good news is that many of these falls can be prevented through physical activity, including strength training, cardiovascular exercise, and especially balance training. Although declining balance in seniors is partially based in unavoidable biological changes, behavioral factors and environmental factors are just as important, and this is where balance training exercises and mindfulness of balance issues come in to make a big difference. The decline of balance in seniors can be looked at as a downward spiral. The first fall or just the decline of stability can lead to a fear of falling. We begin to limit our activities due to this new fear. Decreased physical activity then leads to decreased fitness levels in the areas of strength, endurance, flexibility, and balance, compounding the ruiz against time and gravity. Thus, the risk of falling is actually increased when we begin to limit our activity level due to our fear of falling. Dont fall prey to the downward spiral of declining balance ability; staying active to maintain your physical fitness is the best way to prevent falls.     Everyday Activities Affected by Balance  The following challenges to balance become more difficult to navigate with aging- and inactivity- related changes:  Stopping and starting   Standing up and sitting down   Surface changes, such as pavement to grass or wood floor to carpet   Uneven surfaces, such as sidewalks or park trails   Negotiating obstacles, such as the corner of a rug or coffee table   Changing speed   Changing direction   Level changes, such as going up or down curbs  When you consider how many of the above scenarios you encounter every day, perhaps it will give you a new appreciation for the importance of balance and a renewed desire to maintain and improve your balance system! The Physiology of Balance in Seniors  Three sensory systems must work together to anticipate and respond to challenges to balance. These are the visual system, the vestibular system, and the somatosensory system. If any of these three systems are limited, reaction time and movement quality will be affected. The visual system refers to your sense of sight. Age-related changes in vision may decrease inputs from this sensory system. The vestibular system refers to the inner ear, which gives tells us whether its us thats moving, or the world around us that is moving. The somatosensory system refers to our sense of touch, vibration, pain, etc., resulting from mechanical pressure of some sort on the body. The somatosensory system gives us spatial information about where in space our body parts are located at the current moment. Joint receptors inside the joints themselves also give us information about our body position. The somatosensory system can be trained to become more sensitive to inputs, as well as more effective at communicating outputs that in turn translate to appropriate movements. The speed and accuracy of movements generated by the movement system depend to a large degree on the quality of inputs from the somatosensory system. This is where balance training exercises make a big difference. Three Factors Affecting Balance in Seniors: Biology, Behavior, and Environment  Aging-related changes present very real health and safety hazards.  Some of these factors can be controlled, others can be minimized, and some are inevitable. You have more control than you may think you have over factors affecting your balance. Biological Factors  Vision may decline with age, affecting clarity, contrast, depth perception, and peripheral vision. Hearing may decline, possible affecting the vestibular system in the inner ear. Arthritis affects the quality of sensory input from the somatosensory system. Some seniors unfortunately experience chronic conditions such as Parkinsons and MS which place further limitations on the balance systems. All of this makes it especially important to focus on fitness, which optimizes input from the somatosensory system, helping to compensate for the more inevitable aspects of aging. While strength, power, and endurance decline as part of the aging process, this can be counteracted through physical activity. Key activities include strength training, cardiovascular training, flexibility exercises, and balance exercises. Make exercise more fun by adding group fitness classes such as [employer] to your exercise schedule. Erma Gamez have a great time while getting fit and healthy and meeting new people. Behavioral Factors  Fortunately, many behavioral factors affecting balance in seniors can also be controlled. Behavioral factors include: Activity versus inactivity: Stay active to maintain and improve your fitness. As mentioned above, use an integrated fitness program including components strength training, cardio conditioning, flexibility training, and balance exercises. Proper nutrition: Learn about nutrition and eat a healthy diet. There is some evidence to support the use of certain supplements, such as Vitamin D3, for helping to maintain the balance system. Footwear: Wear shoes that support your feet without being cumbersome and restrictive.  Some experts advocate wearing minimalist footwear that mimic the experience of being barefoot while balance exercises for seniors include suggestions for progressing the exercise in difficulty toward the end of the description. The rule is to never progress until you have perfected the basic movement. Once the basic movement becomes easy to perform with optimal posture, try the suggestions for progressing the exercises difficulty. The exercises begin with posture. Posture is the basis for all movements, and poor posture can be the hidden cause of pain and injury. Make sure to focus on your posture and bodily alignment throughout the day. 8 Effective Balance Training Exercises for Seniors    1. Align at the [employer] with your back against a wall and take stock of your body position. Common postural misalignments include head forward off the wall, shoulders and upper back excessively rounded, tilted pelvis, and overly bent knees. Try to straighten out so that your points of contact with the wall are the back of your head, your shoulders, your rear end, and the backs of your feet. Lengthen through the spine and stand straight. From the side, you should have an invisible racing stripe running down your body: ear, shoulder, hip, knee, and ankle should all be in vertical alignment. Breathing deeply, hold this position with closed eyes for at least 30 seconds, sensing and feeling your body position. As you open your eyes and step away from the wall, maintain this ideal posture. Check in at the wall at various points throughout the day. With time, your posture should naturally improve as your ability to sense and feel your body position becomes more refined. 2. Standing Weight Shifts       Start with optimal posture and alignment established during the Align at the Wall exercise. Lean slightly forward at the ankles, keeping the rest of the body aligned. Keep the feet grounded and active. Lean slightly back at the ankles to challenge your balance in the opposite direction.  Lean in this way from side to side as well without letting your alignment change. Then, go in a Tribe through all four directions several times. Stance options for this move in order of their difficulty include feet hip width apart, a split stance with one foot staggered in front of the other, and finally standing on one leg at a time. Watch the following video to see a demonstration of the single leg version of this movement. You will see that the lean is a pretty subtle shift; it doesnt need to be big. Remember to master the basic movement with both feet on the floor before progressing in difficulty! 3. Marching in Place  This exercise is quite simple. Starting with ideal posture, raise one knee at a time to a 90 degree angle. Turn your head from side to side and/or add upper body movements as you march to increase the challenge. Upper body movements can include reaching up with the opposite arm, reaching to the sides with both arms, or reaching straight up with both arms. 4. Toe Touches on the Floor       With feet hip width apart, reach out one leg and tap the floor in front of you. Return to center, then reach out the side with the same leg and tap. Return to center once again, and then reach out backward with the same leg and tap. Repeat several times for each leg. This movement can be progressed in difficulty by making the taps one continuous half-Tribe instead of returning to center after each tap. You can also further challenge your balance by hovering the foot above the floor throughout the exercise instead of touching down. The following video shows a great demonstration of the foot-hover progression of this exercise. 5. Shuffling from Side to Side  To perform this movement, step out sideways with one foot, then close the other foot to meet it. Repeat several times travelling in one direction, then switch directions.  If you struggle to balance, perform this exercise in front of a wall so you can touch forward with your hands if necessary. Increase the difficulty by turning the head from side to side as you travel, then by shuffling faster. 6. Tightrope Walking  Establish ideal posture, and then begin to walk forward, placing one foot directly in front of the other as if youre on a tightrope. Once you are very comfortable with the basic movement, try going backward. You can also try moving your head from side to side, or moving with eyes closed. If you are at the gym, you can try this exercise on a half foam roll or balance board. 7. Kindling Course  Arrange a series of obstacles such as cones or other small markers in a straight line about four feet apart. If you dont have cones, you can simply use coins or other small household objects. Starting at one end of the course, weave through the obstacles in an S pattern without touching them. Difficulty can be progressed by decreasing the distance between the obstacles. 8. Toe and Heel Walking  This is also a great exercise for strengthening the feet and lower legs. Simply walk on tiptoes for several steps before switching to walking on your heels for several steps. Then switch from toe walking to heel walking every step or so. Increase the difficulty by closing your eyes or turning your head from side to side as you walk. Once you have mastered all of the exercises above, you can increase the difficulty of the sequence by singing or reading aloud while you do your exercises. You can also toss a ball back and forth with a partner as you go through the sequence. These activities increase the sensory inputs as well as the cognitive and motor demands of the exercises, mimicking the distractions of day-to-day life. [employer]  I love the following quote about aging by Biju Mccall: Alvarez Shows dont stop playing because we grow old. We grow old because we stop playing.   Remember that while some aging-related changes are inevitable, you have more control over your body and your life than you might think. Staying active is one of the very best things you can do for yourself at any phase of your life, and especially as you age. You can also find a ton of videos showing balance exercise sequences for seniors by visiting [employer] [place]. com and typing in balance exercises for seniors. FALLS:  HOW TO LOWER YOUR RISK     Who is at high risk of falling? Anyone can fall, although the risk is higher in older people. This increased risk of falling may be the result of changes that come with aging, and certain medical conditions, such as arthritis, cataracts or hip problems. What can I do to lower my risk of falling? Most falls happen in the home. Consider the following tips to make your home safe: Make sure that you have good lighting in your home. A well lit home will help you avoid tripping over objects that are not easy to see. Put night lights in your bedroom, hallways, stairs and bathrooms. Rugs should be firmly fastened to the floor or have nonskid backing. Loose ends should be tacked down. Electrical cords should not be lying on the floor in walking areas. Put hand rails in your bathroom for bath, shower and toilet use. Have rails on both sides of your stairs for support. In the kitchen, make sure items are within easy reach. Don't store things too high or too low. Then you won't have to use a stepladder or a stool to reach them. It's also a good idea to avoid storing things too low, so you won't have to bend down to get them. Wear shoes with firm nonskid soles. Avoid wearing loose-fitting slippers that could cause you to trip. What else can I do? Take good care of your body. Try to stay healthy by following these tips:  See your eye doctor once a year. Cataracts and other eye diseases that cause you not to see well, can lead to falls. Get regular physical activity to keep your bones and muscles strong. Take good care of your feet.  If you have pain in your feet or if you have large, thick nails and corns, have your doctor look at your feet. Talk to your doctor about any side effects you may have from your medicines. Problems caused by side effects from medicine are a common cause of falls. The more medicines you take, the greater your risk of falling. Talk to your doctor if you have dizzy spells. If your doctor suggests that you use a cane or a walker to help you walk, be sure to use it. This will give you extra stability when walking and will help you avoid falls. Don't smoke. Limit alcohol to no more than 2 drinks per day. When you get out of bed in the morning or at night to use the bathroom, sit on the side of the bed for a few minutes before standing up. Your blood pressure takes some time to adjust when you sit up. It may be too low if you get up quickly. This can make you dizzy, and you might lose your balance and fall. Home Safety: How Well Does Your Home Meet Your Needs? Steps/Stairways/Walkways YesNo    Are they in good shape? Do they have a smooth, safe surface? Are there handrails on both sides of the stairway? How about light switches at the top and bottom of the stairs? Is there grasping space for both knuckles and fingers on railings? Are the stair treads deep enough for your whole foot? Would a ramp be feasible in any of these areas if it became necessary? Floor Surfaces YesNo    Is the surface safe? [place]? Any throw rugs or doormats that might slip underfoot? Is carpeting loose or torn? Are there changes in floor levels? If so, are they obvious or well marked? Do you have to step over any electric, telephone, or extension cords? [facility]    Is there always space to park? Is it convenient to the entrance? Does the garage door open automatically? [facility]ors [facility]    Are windows and doors easy to open and close? Are locks sturdy and easy to operate? Do doorways accommodate a walker or wheelchair? Can you walk through the doorways easily? Is there space to maneuver while opening and closing doors? Does the front door have a view panel or peephole at the right height? Appliances/Kitchen/Bath [employer]    Is the room arranged safely and conveniently? Do the oven and fridge open easily? Are stove controls clearly marked and easy to use? Is the counter the right height and depth? Can you work sitting down? Are cabinet doorknobs easy to use? Are faucets easy to use? Do you have a hand-held shower head? Are the items you use often on high shelves? Do you have a step stool with handles? Can you easily get in and out of the tub or shower? Do you have a bath or shower seat? Are there grab bars where needed? Is the hot water heater regulated to prevent scalding or burning? Lighting/Ventilation YesNo    Are there enough lights, and are they bright enough? Do you have night lights where needed? Is area well ventilated? Electrical Outlets/Switches/Alarms YesNo    Can you turn switches easily on and off? Are outlets properly grounded to prevent a shock? Are extension cords in good shape? Do you have smoke detectors in all key areas? Do you have an alarm system? Is the telephone readily available for emergencies? Does the telephone have volume control? Can you hear the doorbell ring all throughout the house? Personalized Preventive Plan for Conner Pyle - 10/6/2021  Medicare offers a range of preventive health benefits. Some of the tests and screenings are paid in full while other may be subject to a deductible, co-insurance, and/or copay. Some of these benefits include a comprehensive review of your medical history including lifestyle, illnesses that may run in your family, and various assessments and screenings as appropriate.     After reviewing your medical record and screening and assessments performed today your provider may have ordered immunizations, labs, imaging, and/or referrals for you. A list of these orders (if applicable) as well as your Preventive Care list are included within your After Visit Summary for your review. Other Preventive Recommendations:    · A preventive eye exam performed by an eye specialist is recommended every 1-2 years to screen for glaucoma; cataracts, macular degeneration, and other eye disorders. · A preventive dental visit is recommended every 6 months. · Try to get at least 150 minutes of exercise per week or 10,000 steps per day on a pedometer . · Order or download the FREE \"Exercise & Physical Activity: Your Everyday Guide\" from [employer] A[employer]. Call 6-696.995.6314 or search [employer] [employer] online. · You need 4968-0474 mg of calcium and 1456-0337 IU of vitamin D per day. It is possible to meet your calcium requirement with diet alone, but a vitamin D supplement is usually necessary to meet this goal.  · When exposed to the sun, use a sunscreen that protects against both UVA and UVB radiation with an SPF of 30 or greater. Reapply every 2 to 3 hours or after sweating, drying off with a towel, or swimming. · Always wear a seat belt when traveling in a car. Always wear a helmet when riding a bicycle or motorcycle.

## 2021-10-06 NOTE — PROGRESS NOTES
Medicare Annual Wellness Visit  Name: Day Nolen  YOB: 1940  Age: [de-identified] y.o. Sex: female  MRN: [id number]     Date of Service:  10/6/2021    Chief Complaint:   Day Nolen is a [de-identified] y.o. female who presents for Medicare Annual Wellness Visit and check-up for:  1. Routine general medical examination at a health care facility    2. Coronary artery disease involving native coronary artery of native heart without angina pectoris    3. Essential hypertension    4. Hyperlipidemia with target LDL less than 100    5. Tobacco abuse    6. Chronic obstructive pulmonary disease, unspecified COPD type ([facility]    7. Major depressive disorder with single episode, in full remission ([facility]    8. [facility]- EF 30-35% 12/2016      HPI    Chief Complaint   Patient presents with    Medicare AWV     Complaints: a few ago fell backward walking up a hill. Has had falls getting up from couch or toilet. Review of Systems - unremarable except as noted above    HISTORY:  Patient's medications, allergies, past medical, and social histories were reviewed and updated as appropriate. CHART REVIEW  Health Maintenance   Topic Date Due    Shingles Vaccine (1 of 2) Never done    DTaP/Tdap/Td vaccine (1 - Tdap) 08/04/2010    Flu vaccine (1) 09/01/2021    Annual Wellness Visit (AWV)  09/05/2021    Potassium monitoring  09/08/2021    Creatinine monitoring  09/08/2021    Lipid screen  09/08/2021    DEXA (modify frequency per FRAX score)  Completed    Pneumococcal 65+ years Vaccine  Completed    COVID-19 Vaccine  Completed    Hepatitis A vaccine  Aged Out    Hepatitis B vaccine  Aged Out    Hib vaccine  Aged Out    Meningococcal (ACWY) vaccine  Aged Out     The ASCVD Risk score (You Spann, et al., 2013) failed to calculate for the following reasons:     The 2013 ASCVD risk score is only valid for ages 36 to 78  Current Outpatient Medications   Medication Instructions    acetaminophen (TYLENOL ARTHRITIS PAIN) 650 mg, EVERY 8 HOURS PRN    aspirin EC 81 mg, DAILY    carvedilol (COREG) 12.5 MG tablet TAKE ONE TABLET BY MOUTH TWICE A DAY WITH MEALS    famotidine (PEPCID) 20 mg, 2 TIMES DAILY    furosemide (LASIX) 20 mg, Oral, DAILY    nortriptyline (PAMELOR) 10 mg, Oral, NIGHTLY    rosuvastatin (CRESTOR) 20 mg, Oral, DAILY    sertraline (ZOLOFT) 100 MG tablet TAKE ONE TABLET BY MOUTH DAILY.       Family History   Problem Relation Age of Onset    Ulcerative Colitis Brother      Social History     Tobacco Use    Smoking status: Current Every Day Smoker     Packs/day: 0.25     Years: 25.00     Pack years: 6.25     Types: Cigarettes    Smokeless tobacco: Never Used    Tobacco comment: advised to quit, reducing some   Vaping Use    Vaping Use: Never used   Substance Use Topics    Alcohol use: No     Alcohol/week: 0.0 standard drinks     Comment: recovering alcoholic- sober since 62/4799    Drug use: No     Comment: Hx fiorcet abuse      Immunization History   Administered Date(s) Administered    COVID-19, Pfizer, PF, 30mcg/0.3mL 05/02/2021, 05/23/2021    Influenza, High Dose (Fluzone 65 yrs and older) 09/22/2011, 10/02/2013, 10/09/2014, 11/15/2016, 01/10/2018, 12/13/2018    Influenza, Triv, inactivated, subunit, adjuvanted, IM (Fluad 65 yrs and older) 12/04/2019    Pneumococcal Conjugate 13-valent ([id number]) 05/12/2016    Pneumococcal Polysaccharide ([id number]) 06/30/2009    Td, unspecified formulation 08/03/2010     LAST LABS  Cholesterol, Total   Date Value Ref Range Status   09/08/2020 267 (H) 0 - 199 mg/dL Final     LDL Calculated   Date Value Ref Range Status   09/08/2020 179 (H) <100 mg/dL Final     HDL   Date Value Ref Range Status   09/08/2020 47 40 - 60 mg/dL Final   09/22/2011 53 40 - 60 mg/dl Final     Triglycerides   Date Value Ref Range Status   09/08/2020 205 (H) 0 - 150 mg/dL Final     Lab Results   Component Value Date    GLUCOSE 102 (H) 09/08/2020 Lab Results   Component Value Date     09/08/2020    K 4.6 09/08/2020    CREATININE 1.3 (H) 09/08/2020     Lab Results   Component Value Date    WBC 7.5 09/08/2020    HGB 13.4 09/08/2020    HCT 39.7 09/08/2020    MCV 97.1 09/08/2020     09/08/2020     Lab Results   Component Value Date    ALT 10 09/08/2020    AST 17 09/08/2020    ALKPHOS 59 09/08/2020    BILITOT 0.5 09/08/2020     TSH (uIU/mL)   Date Value   09/13/2018 1.68     No results found for: LABA1C     CareTeam (Including outside providers/suppliers regularly involved in providing care):   Patient Care Team:  Ken Bob MD as PCP - Jana Cantor MD as PCP - [facility] Empaneled Provider  Esteban Gay MD as Consulting Physician (Pulmonology)    The following problems were reviewed today and where indicated follow up appointments were made and/or referrals ordered. Positive Risk Factor Screenings with Interventions:     Fall Risk:  2 or more falls in past year?: (!) yes  Fall with injury in past year?: (!) yes  Fall Risk Interventions:    · Home safety tips provided  · Physical therapy referral ordered for strength and balance training    Substance History:  Social History     Tobacco History     Smoking Status  Current Every Day Smoker Smoking Frequency  0.25 packs/day for 25 years (6.25 pk yrs) Smoking Tobacco Type  Cigarettes    Smokeless Tobacco Use  Never Used    Tobacco Comment  advised to quit, reducing some          Alcohol History     Alcohol Use Status  No Comment  recovering alcoholic- sober since 63/9773          Drug Use     Drug Use Status  No Comment  Hx fiorcet abuse          Sexual Activity     Sexually Active  Not Currently Partners  Male               Alcohol Screening:       A score of 8 or more is associated with harmful or hazardous drinking. A score of 13 or more in women, and 15 or more in men, is likely to indicate alcohol dependence.   Substance Abuse Interventions:  · Tobacco abuse:  patient is not ready to work toward tobacco cessation at this time    [address] and ACP:  General  In general, how would you say your health is?: Fair  In the past 7 days, have you experienced any of the following?  New or Increased Pain, New or Increased Fatigue, Loneliness, Social Isolation, Stress or Anger?: None of These  Do you get the social and emotional support that you need?: Yes  Do you have a Living Will?: (!) No  Advance Directives     Power of [facility] Will ACP-Advance Directive ACP-Power of     Not on File Not on File Not on File Not on File      General Health Risk Interventions:  · No Living Will: Advance Care Planning addressed with patient today    Health Habits/Nutrition:  Health Habits/Nutrition  Do you exercise for at least 20 minutes 2-3 times per week?: (!) No  Have you lost any weight without trying in the past 3 months?: No  Do you eat only one meal per day?: (!) Yes  Have you seen the dentist within the past year?: (!) No  Body mass index: (!) 26.95  Health Habits/Nutrition Interventions:  · Nutritional issues:  patient is not ready to address his/her nutritional/weight issues at this time    Hearing/Vision:  No exam data present  Hearing/Vision  Do you or your family notice any trouble with your hearing that hasn't been managed with hearing aids?: No  Do you have difficulty driving, watching TV, or doing any of your daily activities because of your eyesight?: No  Have you had an eye exam within the past year?: (!) No  Hearing/Vision Interventions:  · Vision concerns:  patient encouraged to make appointment with his/her eye specialist, has glasses    Safety:  Safety  Do you have working smoke detectors?: Yes  Have all throw rugs been removed or fastened?: (!) No  Do you have non-slip mats or surfaces in all bathtubs/showers?: Yes  Do all of your stairways have a railing or banister?: Yes  Are your doorways, halls and stairs free of clutter?: Yes  Do you always fasten your seatbelt when you are in a car?: Yes  Safety Interventions:  · Home safety tips provided    ADL:  ADLs  In the past 7 days, did you need help from others to perform any of the following everyday activities? Eating, dressing, grooming, bathing, toileting, or walking/balance?: (!) Walking/Balance  In the past 7 days, did you need help from others to take care of any of the following? Laundry, housekeeping, banking/finances, shopping, telephone use, food preparation, transportation, or taking medications?: (!) Banking/Finances, Shopping, Food Preparation, Transportation  ADL Interventions:  · Referred to [employer]    Current Health Maintenance Status  Recommendations for "[employer]" Due: see orders. Recommended screening schedule for the next 5-10 years is provided to the patient in written form: see Patient Instructions/AVS.    PHYSICAL EXAM:  VITALS:  /76 (Site: Right Upper Arm, Position: Sitting, Cuff Size: Medium Adult)   Pulse 81   Ht 5' 6\" (1.676 m)   Wt 167 lb (75.8 kg)   SpO2 95%   BMI 26.95 kg/m²   BP Readings from Last 5 Encounters:   10/06/21 126/76   12/04/19 (!) 154/86   07/25/19 122/82   12/12/18 136/80   11/28/18 132/80     Wt Readings from Last 5 Encounters:   10/06/21 167 lb (75.8 kg)   12/04/19 173 lb 6.4 oz (78.7 kg)   07/25/19 171 lb (77.6 kg)   12/12/18 171 lb (77.6 kg)   11/28/18 169 lb (76.7 kg)   Body mass index is 26.95 kg/m². GENERAL: well-developed, well-nourished, alert, no distress, calm   EYES: negative findings: lids and lashes normal and conjunctivae and sclerae normal  ENT: normal TM's and external ear canals both ears  · External nose and ears appear normal  · Pharynx: normal. Exudates: None  · Lips, mucosa, and tongue normal  · Hearing grossly.     NECK: No adenopathy, supple, symmetrical, trachea midline  · Thyroid not enlarged, symmetric, no tenderness/mass/nodules  · no cervical nodes, no supraclavicular nodes  LUNGS:  Breathing unlabored  · clear to auscultation bilaterally and good air movement  CARDIOVASC: regular rate and rhythm, S1, S2 normal   LEGS:  Lower extremity edema: none     No carotid bruits  ABDOMEN: Soft, non-tender, no masses  · No hepatosplenomegaly  · No hernias noted. Exam limited by N/A  SKIN: warm and dry  · No rashes or suspicious lesions  PSYCH:  Alert and oriented  · Normal reasoning, insight good  · Facial expressions full, mood appropriate  · No memory disturbance noted  MUSCULOSKEL:  No significant finger or nail findings  · Spine symmetric, no deformities, no kyphosis   GAIT: UP and Go test: <30 seconds with gait: unsteady. Speed slow. No significant balance checks. Extra steps on turn around. Assistive device: none        Assessment and Plan:      Diagnosis Orders   1. Routine general medical examination at a health care facility     2. Coronary artery disease involving native coronary artery of native heart without angina pectoris     3. Essential hypertension     4. Hyperlipidemia with target LDL less than 100     5. Tobacco abuse     6. Chronic obstructive pulmonary disease, unspecified COPD type ([facility]     7. Major depressive disorder with single episode, in full remission ([facility] [facility]     8. Chronic systolic [facility] failure ([facility]- EF 30-35% 12/2016     Stable. Plan as above and below. FYI: While Medicare provides you with a FREE ANNUAL PREVENTIVE PHYSICAL, this visit does NOT include management of chronic medical problems or physical examination. Dr. Brian Agudelo usually does a combination visit if you have other medical problems so you don't have to come back for another visit. However, this means that there will be a co-pay. INSTRUCTIONS  NEXT APPOINTMENT: Please schedule check-up in 6 months. · PLEASE TAKE THIS FORM TO CHECK-OUT WINDOW TO SCHEDULE NEXT VISIT. PLEASE GET FASTING BLOODWORK DRAWN SOON. Lab is on first floor in [address].  Hours Monday to Friday 7 AM to 5 PM.   · Please bring in copy of Living Will and Medical Power of Sanchez Watkins for our records. Someone from [facility] will be calling you. · Try to get to a few sessions of gait and balance training. · Get Pfizer vaccine #3 Nov- Feb range. · Look into quad cane with large base    [place] [facility]  [address], [address]   [phone]  [email or web address]  Helps older adults remain in their homes and communities with independence and dignity. We are the front door to a network of services and resources for older adults, their caregivers, families and professionals.    [facility]   Intermediate, community-based care: Assisted LivingWaiver   Caregiver support and training   Funding for other community services: recreation, transportation, health  education, legal help, Alzheimers services, and more  ·   ·

## 2021-10-08 ENCOUNTER — CLINICAL DOCUMENTATION (OUTPATIENT)
Dept: SPIRITUAL SERVICES | Age: 81
End: 2021-10-08

## 2021-10-08 NOTE — PROGRESS NOTES
Advance Care Planning    Ambulatory ACP Specialist Patient Outreach    Date:  10/8/2021  ACP Specialist:  ANGELA Pulido    Outreach call to patient in follow-up to ACP Specialist referral from: Jai Hoffman MD    [x] PCP  [] Provider   [] Ambulatory Care Management [] Other for Reason:        [] Early ACP Decision-Making   [x] Advance Directive Assistance   [] Discuss Goals of Care   [] Code Status Discussion   [] Completion of Portable DNR order   [] Complete POST/MOST   [] Other (Specify)    Date Referral Received: 10-6/21    Today's Outreach:  [] First   [] Second  [] Third                               Third outreach made by []  phone  [] email []   MyChart     Intervention:  [] Spoke with Patient  [x] Left VM requesting return call       Outcome: Message left for patient and there is no email or MyChart to send a message to for patient. Next Step:   [] ACP scheduled conversation  [x] Outreach again in two weeks               [] Email / Mail ACP Info Sheets  [] Email / Mail Advance Directive            [] Close Referral. Routing closure to referring provider/staff and to ACP Specialist .      Thank you for this referral.

## 2021-10-25 ENCOUNTER — CLINICAL DOCUMENTATION (OUTPATIENT)
Dept: SPIRITUAL SERVICES | Age: 81
End: 2021-10-25

## 2021-10-29 DIAGNOSIS — I50.22 CHRONIC SYSTOLIC CONGESTIVE HEART FAILURE (HCC): ICD-10-CM

## 2021-10-29 DIAGNOSIS — I10 ESSENTIAL HYPERTENSION: ICD-10-CM

## 2021-10-29 RX ORDER — CARVEDILOL 12.5 MG/1
TABLET ORAL
Qty: 180 TABLET | Refills: 0 | Status: SHIPPED | OUTPATIENT
Start: 2021-10-29 | End: 2021-11-01

## 2021-11-01 DIAGNOSIS — I50.22 CHRONIC SYSTOLIC CONGESTIVE HEART FAILURE (HCC): ICD-10-CM

## 2021-11-01 DIAGNOSIS — I10 ESSENTIAL HYPERTENSION: ICD-10-CM

## 2021-11-01 RX ORDER — CARVEDILOL 12.5 MG/1
TABLET ORAL
Qty: 180 TABLET | Refills: 0 | Status: SHIPPED | OUTPATIENT
Start: 2021-11-01 | End: 2022-02-25 | Stop reason: SDUPTHER

## 2021-11-04 ENCOUNTER — CLINICAL DOCUMENTATION (OUTPATIENT)
Dept: SPIRITUAL SERVICES | Age: 81
End: 2021-11-04

## 2021-11-04 NOTE — PROGRESS NOTES
Advance Care Planning    Ambulatory ACP Specialist Patient Outreach    Date:  11/4/2021  ACP Specialist:  ANGELA Whyte    Outreach call to patient in follow-up to ACP Specialist referral from: Edinson Christine MD    [] PCP  [] Provider   [] Ambulatory Care Management [] Other for Reason:        [] Early ACP Decision-Making   [x] Advance Directive Assistance   [] Discuss Goals of Care   [] Code Status Discussion   [] Completion of Portable DNR order   [] Complete POST/MOST   [] Other (Specify)    Date Referral Received: 10/6/21    Today's Outreach:  [] First   [] Second  [x] Third                               Third outreach made by []  phone  [] email []   [employer]     Intervention:  [] Spoke with Patient  [x] Left VM requesting return call      Outcome: ACP Specialist has tried three times to reach patient without a call back. Next Step:   [] ACP scheduled conversation  [] Outreach again in one week               [] Email / Mail ACP Info Sheets  [] Email / Mail Advance Directive            [x] Close Referral. Routing closure to referring provider/staff and to ACP Specialist .      Thank you for this referral.

## 2021-12-01 DIAGNOSIS — F32.5 MAJOR DEPRESSIVE DISORDER WITH SINGLE EPISODE, IN FULL REMISSION (HCC): ICD-10-CM

## 2021-12-02 RX ORDER — SERTRALINE HYDROCHLORIDE 100 MG/1
TABLET, FILM COATED ORAL
Qty: 30 TABLET | Refills: 1 | Status: SHIPPED | OUTPATIENT
Start: 2021-12-02 | End: 2022-03-28 | Stop reason: SDUPTHER

## 2022-02-25 DIAGNOSIS — I50.22 CHRONIC SYSTOLIC CONGESTIVE HEART FAILURE (HCC): ICD-10-CM

## 2022-02-25 DIAGNOSIS — I10 ESSENTIAL HYPERTENSION: ICD-10-CM

## 2022-02-25 RX ORDER — CARVEDILOL 12.5 MG/1
TABLET ORAL
Qty: 180 TABLET | Refills: 1 | Status: SHIPPED | OUTPATIENT
Start: 2022-02-25 | End: 2022-05-31 | Stop reason: SDUPTHER

## 2022-03-28 DIAGNOSIS — F32.5 MAJOR DEPRESSIVE DISORDER WITH SINGLE EPISODE, IN FULL REMISSION (HCC): ICD-10-CM

## 2022-03-28 RX ORDER — SERTRALINE HYDROCHLORIDE 100 MG/1
TABLET, FILM COATED ORAL
Qty: 30 TABLET | Refills: 1 | Status: SHIPPED | OUTPATIENT
Start: 2022-03-28 | End: 2022-05-31

## 2022-05-31 ENCOUNTER — OFFICE VISIT (OUTPATIENT)
Dept: FAMILY MEDICINE CLINIC | Age: 82
End: 2022-05-31
Payer: MEDICARE

## 2022-05-31 ENCOUNTER — HOSPITAL ENCOUNTER (OUTPATIENT)
Dept: GENERAL RADIOLOGY | Age: 82
Discharge: HOME | End: 2022-05-31
Payer: MEDICARE

## 2022-05-31 ENCOUNTER — HOSPITAL ENCOUNTER (OUTPATIENT)
Age: 82
Discharge: HOME | End: 2022-05-31
Payer: MEDICARE

## 2022-05-31 VITALS
BODY MASS INDEX: 24.56 KG/M2 | HEART RATE: 71 BPM | DIASTOLIC BLOOD PRESSURE: 60 MMHG | SYSTOLIC BLOOD PRESSURE: 104 MMHG | RESPIRATION RATE: 18 BRPM | HEIGHT: 66 IN | OXYGEN SATURATION: 96 % | WEIGHT: 152.8 LBS

## 2022-05-31 DIAGNOSIS — R63.4 WEIGHT LOSS: ICD-10-CM

## 2022-05-31 DIAGNOSIS — Z72.0 TOBACCO ABUSE: ICD-10-CM

## 2022-05-31 DIAGNOSIS — F32.5 MAJOR DEPRESSIVE DISORDER WITH SINGLE EPISODE, IN FULL REMISSION (HCC): ICD-10-CM

## 2022-05-31 DIAGNOSIS — R22.31 AXILLARY MASS, RIGHT: Primary | ICD-10-CM

## 2022-05-31 DIAGNOSIS — R73.9 HYPERGLYCEMIA: ICD-10-CM

## 2022-05-31 DIAGNOSIS — I10 ESSENTIAL HYPERTENSION: ICD-10-CM

## 2022-05-31 DIAGNOSIS — R59.9 ENLARGED LYMPH NODES, UNSPECIFIED: ICD-10-CM

## 2022-05-31 DIAGNOSIS — R23.3 EASY BRUISING: ICD-10-CM

## 2022-05-31 DIAGNOSIS — J44.9 CHRONIC OBSTRUCTIVE PULMONARY DISEASE, UNSPECIFIED COPD TYPE (HCC): ICD-10-CM

## 2022-05-31 DIAGNOSIS — I50.22 CHRONIC SYSTOLIC CONGESTIVE HEART FAILURE (HCC): ICD-10-CM

## 2022-05-31 DIAGNOSIS — R42 DIZZINESS: ICD-10-CM

## 2022-05-31 LAB
A/G RATIO: 1.7 (ref 1.1–2.2)
ALBUMIN SERPL-MCNC: 4.2 G/DL (ref 3.4–5)
ALP BLD-CCNC: 72 U/L (ref 40–129)
ALT SERPL-CCNC: 9 U/L (ref 10–40)
ANION GAP SERPL CALCULATED.3IONS-SCNC: 14 MMOL/L (ref 3–16)
AST SERPL-CCNC: 19 U/L (ref 15–37)
BASOPHILS ABSOLUTE: 0 K/UL (ref 0–0.2)
BASOPHILS RELATIVE PERCENT: 0.7 %
BILIRUB SERPL-MCNC: 0.4 MG/DL (ref 0–1)
BUN BLDV-MCNC: 15 MG/DL (ref 7–20)
CALCIUM SERPL-MCNC: 9.2 MG/DL (ref 8.3–10.6)
CHLORIDE BLD-SCNC: 100 MMOL/L (ref 99–110)
CO2: 22 MMOL/L (ref 21–32)
CREAT SERPL-MCNC: 1.3 MG/DL (ref 0.6–1.2)
EOSINOPHILS ABSOLUTE: 0.3 K/UL (ref 0–0.6)
EOSINOPHILS RELATIVE PERCENT: 3.8 %
GFR AFRICAN AMERICAN: 48
GFR NON-AFRICAN AMERICAN: 39
GLUCOSE BLD-MCNC: 105 MG/DL (ref 70–99)
HCT VFR BLD CALC: 40.2 % (ref 36–48)
HEMOGLOBIN: 13.6 G/DL (ref 12–16)
LYMPHOCYTES ABSOLUTE: 1.3 K/UL (ref 1–5.1)
LYMPHOCYTES RELATIVE PERCENT: 19.1 %
MCH RBC QN AUTO: 33.5 PG (ref 26–34)
MCHC RBC AUTO-ENTMCNC: 33.7 G/DL (ref 31–36)
MCV RBC AUTO: 99.2 FL (ref 80–100)
MONOCYTES ABSOLUTE: 0.5 K/UL (ref 0–1.3)
MONOCYTES RELATIVE PERCENT: 7.5 %
NEUTROPHILS ABSOLUTE: 4.7 K/UL (ref 1.7–7.7)
NEUTROPHILS RELATIVE PERCENT: 68.9 %
PDW BLD-RTO: 15.3 % (ref 12.4–15.4)
PLATELET # BLD: 204 K/UL (ref 135–450)
PMV BLD AUTO: 7.3 FL (ref 5–10.5)
POTASSIUM SERPL-SCNC: 5 MMOL/L (ref 3.5–5.1)
RBC # BLD: 4.05 M/UL (ref 4–5.2)
SODIUM BLD-SCNC: 136 MMOL/L (ref 136–145)
TOTAL PROTEIN: 6.7 G/DL (ref 6.4–8.2)
TSH SERPL DL<=0.05 MIU/L-ACNC: 1.59 UIU/ML (ref 0.27–4.2)
WBC # BLD: 6.9 K/UL (ref 4–11)

## 2022-05-31 PROCEDURE — G8420 CALC BMI NORM PARAMETERS: HCPCS | Performed by: FAMILY MEDICINE

## 2022-05-31 PROCEDURE — 4004F PT TOBACCO SCREEN RCVD TLK: CPT | Performed by: FAMILY MEDICINE

## 2022-05-31 PROCEDURE — 3023F SPIROM DOC REV: CPT | Performed by: FAMILY MEDICINE

## 2022-05-31 PROCEDURE — G8427 DOCREV CUR MEDS BY ELIG CLIN: HCPCS | Performed by: FAMILY MEDICINE

## 2022-05-31 PROCEDURE — 1090F PRES/ABSN URINE INCON ASSESS: CPT | Performed by: FAMILY MEDICINE

## 2022-05-31 PROCEDURE — G8399 PT W/DXA RESULTS DOCUMENT: HCPCS | Performed by: FAMILY MEDICINE

## 2022-05-31 PROCEDURE — 71046 X-RAY EXAM CHEST 2 VIEWS: CPT

## 2022-05-31 PROCEDURE — 1123F ACP DISCUSS/DSCN MKR DOCD: CPT | Performed by: FAMILY MEDICINE

## 2022-05-31 PROCEDURE — 99214 OFFICE O/P EST MOD 30 MIN: CPT | Performed by: FAMILY MEDICINE

## 2022-05-31 RX ORDER — CARVEDILOL 3.12 MG/1
TABLET ORAL
Qty: 60 TABLET | Refills: 3 | Status: SHIPPED | OUTPATIENT
Start: 2022-05-31

## 2022-05-31 RX ORDER — DESVENLAFAXINE 50 MG/1
50 TABLET, EXTENDED RELEASE ORAL DAILY
Qty: 30 TABLET | Refills: 0 | Status: SHIPPED | OUTPATIENT
Start: 2022-05-31 | End: 2022-08-08

## 2022-05-31 ASSESSMENT — PATIENT HEALTH QUESTIONNAIRE - PHQ9
2. FEELING DOWN, DEPRESSED OR HOPELESS: 3
SUM OF ALL RESPONSES TO PHQ QUESTIONS 1-9: 6
9. THOUGHTS THAT YOU WOULD BE BETTER OFF DEAD, OR OF HURTING YOURSELF: 0
1. LITTLE INTEREST OR PLEASURE IN DOING THINGS: 0
SUM OF ALL RESPONSES TO PHQ QUESTIONS 1-9: 6
7. TROUBLE CONCENTRATING ON THINGS, SUCH AS READING THE NEWSPAPER OR WATCHING TELEVISION: 0
3. TROUBLE FALLING OR STAYING ASLEEP: 0
6. FEELING BAD ABOUT YOURSELF - OR THAT YOU ARE A FAILURE OR HAVE LET YOURSELF OR YOUR FAMILY DOWN: 0
5. POOR APPETITE OR OVEREATING: 0
SUM OF ALL RESPONSES TO PHQ QUESTIONS 1-9: 6
10. IF YOU CHECKED OFF ANY PROBLEMS, HOW DIFFICULT HAVE THESE PROBLEMS MADE IT FOR YOU TO DO YOUR WORK, TAKE CARE OF THINGS AT HOME, OR GET ALONG WITH OTHER PEOPLE: 0
SUM OF ALL RESPONSES TO PHQ9 QUESTIONS 1 & 2: 3
8. MOVING OR SPEAKING SO SLOWLY THAT OTHER PEOPLE COULD HAVE NOTICED. OR THE OPPOSITE, BEING SO FIGETY OR RESTLESS THAT YOU HAVE BEEN MOVING AROUND A LOT MORE THAN USUAL: 0
4. FEELING TIRED OR HAVING LITTLE ENERGY: 3
SUM OF ALL RESPONSES TO PHQ QUESTIONS 1-9: 6

## 2022-05-31 NOTE — PROGRESS NOTES
PROBLEM VISIT NOTE     Subjective:     Chief Complaint   Patient presents with    Follow-up     Pt is here to follow up on hypertension.  Other     Pt said that she has been coughing up stuff in her chest, along with getting dizzy. Pt said she will sometimes get diarrhea. Corwin Carreon is a 80 y.o. female who presents gets diarrhea several times a month, couple times an episode. Watery, no blood, no cramp x 6 mos. · Decreased appetite  · No fever, night sweats. · Feels down and sad. Not left house since here last fall. Feels she cannot do anything due to back. Family willing to provide transport but she refuses to leave house. · Cannot stand up more than 15 minutes at a time. Denies numbness. · Cough in AM, productive clear, few months. Still smoking. · Lump under R arm 4-5 wks, getting bigger, tender to touch  · Gets dizzy standing, off balance, no significant   · Bruise below R eye x 2 d getting bigger    Health Maintenance Due   Topic Date Due    Shingles vaccine (1 of 2) Never done    DTaP/Tdap/Td vaccine (1 - Tdap) 08/04/2010     CHART REVIEW   reports that she has been smoking cigarettes. She has a 6.25 pack-year smoking history.  She has never used smokeless tobacco.  Health Maintenance Due   Topic Date Due    Shingles vaccine (1 of 2) Never done    DTaP/Tdap/Td vaccine (1 - Tdap) 08/04/2010     Current Outpatient Medications   Medication Instructions    acetaminophen (TYLENOL ARTHRITIS PAIN) 650 mg, EVERY 8 HOURS PRN    aspirin EC 81 mg, DAILY    carvedilol (COREG) 3.125 MG tablet TAKE ONE TABLET BY MOUTH TWICE A DAY WITH MEALS    desvenlafaxine succinate (PRISTIQ) 50 mg, Oral, DAILY     LAST LABS  LDL Calculated   Date Value Ref Range Status   09/08/2020 179 (H) <100 mg/dL Final     Lab Results   Component Value Date    HDL 47 09/08/2020     Lab Results   Component Value Date    TRIG 205 (H) 09/08/2020     Lab Results   Component Value Date     09/08/2020    K 4.6 09/08/2020 CREATININE 1.3 (H) 09/08/2020     Lab Results   Component Value Date    WBC 7.5 09/08/2020    HGB 13.4 09/08/2020     09/08/2020     Lab Results   Component Value Date    ALT 10 09/08/2020    AST 17 09/08/2020    ALKPHOS 59 09/08/2020    BILITOT 0.5 09/08/2020     TSH (uIU/mL)   Date Value   09/13/2018 1.68     No results found for: LABA1C  Objective:   PHYSICAL EXAM   /60 (Site: Right Upper Arm, Position: Sitting, Cuff Size: Medium Adult)   Pulse 71   Resp 18   Ht 5' 6\" (1.676 m)   Wt 152 lb 12.8 oz (69.3 kg)   SpO2 96%   BMI 24.66 kg/m²   BP Readings from Last 5 Encounters:   05/31/22 104/60   10/06/21 126/76   12/04/19 (!) 154/86   07/25/19 122/82   12/12/18 136/80     Down 15 lbs in 5-6 mo  Wt Readings from Last 5 Encounters:   05/31/22 152 lb 12.8 oz (69.3 kg)   10/06/21 167 lb (75.8 kg)   12/04/19 173 lb 6.4 oz (78.7 kg)   07/25/19 171 lb (77.6 kg)   12/12/18 171 lb (77.6 kg)      GENERAL:   · well-developed, well-nourished, alert, no distress. LUNGS:    · Breathing unlabored  · clear to auscultation bilaterally and good air movement  CARDIOVASC:   · regular rate and rhythm  SKIN: warm and dry  · Bruise below R eye 1 x 1.5 cm  LYMPH- R axilla irregular mass in middle 1-2 cm, mobile. R post axilla similar sized mass     Assessment and Plan:      Diagnosis Orders   1. Axillary mass, right  New. Concerning for malignancy (breast, lymphoma). See plan below. [facility]HA DIGITAL DIAGNOSTIC W OR WO CAD BILATERAL   2. Enlarged lymph nodes, unspecified   SASHA DIGITAL DIAGNOSTIC W OR WO CAD BILATERAL   3. Weight loss  Concerning for malignancy and depression. Check CXR and labs to direct further evaluation. XR CHEST (2 VW)    Comprehensive Metabolic Panel    Ambulatory referral to Pulmonology   4. Dizziness  NEw porbably due to hypotension related to weight loss. Decrease Coreg.    5. Chronic obstructive pulmonary disease, unspecified COPD type ([facility]  Appears stable  XR CHEST (2 VW)    Ambulatory referral to Pulmonology   6. Easy bruising  New. Get blood work   CBC with Auto Differential   7. Chronic systolic congestive heart failure (HCC)  Appears stable. Will monitor on lowered coreg   8. Major depressive disorder with single episode, in full remission ([facility]  Worse. Try changing SSRI to SNRI with close follow-up  TSH    desvenlafaxine succinate (PRISTIQ) 50 MG TB24 extended release tablet   9. Tobacco abuse  Patient advised to quit smoking and assistance in doing so was offered. 10. Essential hypertension  carvedilol (COREG) 3.125 MG tablet- dose lowered   11. Hyperglycemia  Hemoglobin A1C   12. Chronic systolic congestive heart failure ([facility]- EF 30-35% 12/2016  carvedilol (COREG) 3.125 MG tablet      INSTRUCTIONS  NEXT APPOINTMENT: Please schedule check-up in 2 weeks   ·  PLEASE TAKE THIS FORM TO CHECK-OUT WINDOW TO SCHEDULE NEXT VISIT. · Get x-ray. Can [facility] to get the [facility]-ray. No appointment needed. · PLEASE GET BLOODWORK DRAWN TODAY ON FIRST FLOOR in [address]. Take orders with you. RESULTS- most blood tests back in couple days. We will call you if any problems. If bloodwork good, you will get letter in mail or notified thru [address] (if signed up) within 2 weeks. If you do not, please call office. · Please get mammogram soon to screen for breast cancer. To schedule at a [facility], please call [phone]. · If chest x-ray and mammogram do not explain R armpit lumps, will need to see surgeon for removal with local anesthetic. · Probably needs further imaging, possibly colonoscopy and cardiac ECHO. · DECREASE carvedilol to 3.125 mg twice a day. · CHANGE zoloft to Pristiq. Alternate every other day for about 8 days then take the Pristiq every day. May need to see counselor. · For pain, may take OTC tylenol arthritis (acetaminophen 8 hour) 2 tabs three times per day  · To get extra calories, try drinking [employer], Boost or Ensure Plus once or twice a day.

## 2022-06-01 LAB
ESTIMATED AVERAGE GLUCOSE: 122.6 MG/DL
HBA1C MFR BLD: 5.9 %

## 2022-06-02 DIAGNOSIS — R22.31 AXILLARY MASS, RIGHT: Primary | ICD-10-CM

## 2022-06-02 DIAGNOSIS — R59.0 LOCALIZED ENLARGED LYMPH NODES: ICD-10-CM

## 2022-06-07 ENCOUNTER — HOSPITAL ENCOUNTER (OUTPATIENT)
Dept: ULTRASOUND IMAGING | Age: 82
Discharge: HOME | End: 2022-06-07
Payer: MEDICARE

## 2022-06-07 ENCOUNTER — HOSPITAL ENCOUNTER (OUTPATIENT)
Dept: MAMMOGRAPHY | Age: 82
Discharge: HOME | End: 2022-06-07
Payer: MEDICARE

## 2022-06-07 VITALS — WEIGHT: 148 LBS | HEIGHT: 66 IN | BODY MASS INDEX: 23.78 KG/M2

## 2022-06-07 DIAGNOSIS — R59.0 LOCALIZED ENLARGED LYMPH NODES: ICD-10-CM

## 2022-06-07 DIAGNOSIS — N63.0 PALPABLE MASS OF BREAST: ICD-10-CM

## 2022-06-07 DIAGNOSIS — R22.31 AXILLARY MASS, RIGHT: ICD-10-CM

## 2022-06-07 PROCEDURE — 76642 ULTRASOUND BREAST LIMITED: CPT

## 2022-06-07 PROCEDURE — G0279 TOMOSYNTHESIS, MAMMO: HCPCS

## 2022-06-08 ENCOUNTER — CASE MANAGEMENT (OUTPATIENT)
Dept: WOMENS IMAGING | Age: 82
End: 2022-06-08

## 2022-06-08 DIAGNOSIS — R92.8 ABNORMAL MAMMOGRAM OF RIGHT BREAST: Primary | ICD-10-CM

## 2022-06-09 ENCOUNTER — CASE MANAGEMENT (OUTPATIENT)
Dept: WOMENS IMAGING | Age: 82
End: 2022-06-09

## 2022-06-15 ENCOUNTER — OFFICE VISIT (OUTPATIENT)
Dept: FAMILY MEDICINE CLINIC | Age: 82
End: 2022-06-15
Payer: MEDICARE

## 2022-06-15 ENCOUNTER — HOSPITAL ENCOUNTER (OUTPATIENT)
Dept: ULTRASOUND IMAGING | Age: 82
Discharge: HOME | End: 2022-06-15
Payer: MEDICARE

## 2022-06-15 ENCOUNTER — OFFICE VISIT (OUTPATIENT)
Dept: BREAST CENTER | Age: 82
End: 2022-06-15
Payer: MEDICARE

## 2022-06-15 VITALS
HEIGHT: 66 IN | DIASTOLIC BLOOD PRESSURE: 82 MMHG | OXYGEN SATURATION: 96 % | HEART RATE: 84 BPM | BODY MASS INDEX: 24.11 KG/M2 | WEIGHT: 150 LBS | SYSTOLIC BLOOD PRESSURE: 134 MMHG | RESPIRATION RATE: 12 BRPM

## 2022-06-15 VITALS — DIASTOLIC BLOOD PRESSURE: 64 MMHG | HEART RATE: 86 BPM | SYSTOLIC BLOOD PRESSURE: 149 MMHG

## 2022-06-15 DIAGNOSIS — E78.5 HYPERLIPIDEMIA WITH TARGET LDL LESS THAN 100: ICD-10-CM

## 2022-06-15 DIAGNOSIS — R63.4 WEIGHT LOSS: Primary | ICD-10-CM

## 2022-06-15 DIAGNOSIS — Z72.0 TOBACCO ABUSE: ICD-10-CM

## 2022-06-15 DIAGNOSIS — R22.31 AXILLARY MASS, RIGHT: Primary | ICD-10-CM

## 2022-06-15 DIAGNOSIS — R22.31 AXILLARY MASS, RIGHT: ICD-10-CM

## 2022-06-15 DIAGNOSIS — N18.32 STAGE 3B CHRONIC KIDNEY DISEASE (HCC): ICD-10-CM

## 2022-06-15 DIAGNOSIS — F32.4 MAJOR DEPRESSIVE DISORDER WITH SINGLE EPISODE, IN PARTIAL REMISSION (HCC): ICD-10-CM

## 2022-06-15 DIAGNOSIS — R92.8 ABNORMAL MAMMOGRAM OF RIGHT BREAST: ICD-10-CM

## 2022-06-15 DIAGNOSIS — I10 ESSENTIAL HYPERTENSION: ICD-10-CM

## 2022-06-15 PROCEDURE — G8399 PT W/DXA RESULTS DOCUMENT: HCPCS | Performed by: SURGERY

## 2022-06-15 PROCEDURE — G8427 DOCREV CUR MEDS BY ELIG CLIN: HCPCS | Performed by: SURGERY

## 2022-06-15 PROCEDURE — 4004F PT TOBACCO SCREEN RCVD TLK: CPT | Performed by: SURGERY

## 2022-06-15 PROCEDURE — G8399 PT W/DXA RESULTS DOCUMENT: HCPCS | Performed by: FAMILY MEDICINE

## 2022-06-15 PROCEDURE — 99214 OFFICE O/P EST MOD 30 MIN: CPT | Performed by: FAMILY MEDICINE

## 2022-06-15 PROCEDURE — G8420 CALC BMI NORM PARAMETERS: HCPCS | Performed by: SURGERY

## 2022-06-15 PROCEDURE — G8420 CALC BMI NORM PARAMETERS: HCPCS | Performed by: FAMILY MEDICINE

## 2022-06-15 PROCEDURE — 1090F PRES/ABSN URINE INCON ASSESS: CPT | Performed by: SURGERY

## 2022-06-15 PROCEDURE — 1090F PRES/ABSN URINE INCON ASSESS: CPT | Performed by: FAMILY MEDICINE

## 2022-06-15 PROCEDURE — 1123F ACP DISCUSS/DSCN MKR DOCD: CPT | Performed by: SURGERY

## 2022-06-15 PROCEDURE — 76642 ULTRASOUND BREAST LIMITED: CPT

## 2022-06-15 PROCEDURE — 4004F PT TOBACCO SCREEN RCVD TLK: CPT | Performed by: FAMILY MEDICINE

## 2022-06-15 PROCEDURE — 99204 OFFICE O/P NEW MOD 45 MIN: CPT | Performed by: SURGERY

## 2022-06-15 PROCEDURE — 1123F ACP DISCUSS/DSCN MKR DOCD: CPT | Performed by: FAMILY MEDICINE

## 2022-06-15 PROCEDURE — G8427 DOCREV CUR MEDS BY ELIG CLIN: HCPCS | Performed by: FAMILY MEDICINE

## 2022-06-15 NOTE — PROGRESS NOTES
CHRONIC CONDITION FOLOW-UP     Assessment and Plan:      Diagnosis Orders   1. Weight loss  Up 2 lbs in 2 weeks. Continue to try finding supplement she likes. 2. Axillary mass, right  Biopsy today then plans accordingly. 3. Major depressive disorder with single episode, in partial remission ([facility] [address]  Tolerating Pristiq. Maybe helping. 4. Essential hypertension  Stable with current medications. No adjustments needed. Will continue to monitor. 5. Stage 3b chronic kidney disease ([facility] [address]  Stable. 6. Hyperlipidemia with target LDL less than 100     7. Tobacco abuse  Patient advised to quit smoking and assistance in doing so was offered. Stable     Continue current Tx plan. Any changes marked below. INSTRUCTIONS  NEXT APPOINTMENT: Please schedule check-up in 1 month. · PLEASE TAKE THIS FORM TO CHECK-OUT WINDOW TO SCHEDULE NEXT VISIT. May take Mucinex (guaifenisen) and Robitussin DM (guafenisen, dextromethorphan) for cough and congestion. May also try Vicks Vaporub. · Breast biopsy today. Subjective:      Chief Complaint   Patient presents with    Hypertension     f/u htn and weight loss    Depression     Georgiana Adam is an 80 y.o. female who presents for follow up    Complaints:    Getting dry heaves after coughing in AM. CXR was clear.  Getting FNA of axilla/breast mass   Still not eating. Not like Ensure.  No energy.  No diarrhea since last visit.  Tolerating the new Pristiq    CHART REVIEW   reports that she has been smoking cigarettes. She has a 16.25 pack-year smoking history.  She has never used smokeless tobacco.  Health Maintenance Due   Topic Date Due    Shingles vaccine (1 of 2) Never done    DTaP/Tdap/Td vaccine (1 - Tdap) 08/04/2010     Current Outpatient Medications   Medication Instructions    acetaminophen (TYLENOL ARTHRITIS PAIN) 650 mg, EVERY 8 HOURS PRN    aspirin EC 81 mg, DAILY    carvedilol (COREG) 3.125 MG tablet TAKE ONE TABLET BY MOUTH TWICE A DAY WITH MEALS    desvenlafaxine succinate (PRISTIQ) 50 mg, Oral, DAILY     LAST LABS  Lab Results   Component Value Date    LDLCALC 179 (H) 09/08/2020     Lab Results   Component Value Date    HDL 47 09/08/2020     Lab Results   Component Value Date    TRIG 205 (H) 09/08/2020     Lab Results   Component Value Date     05/31/2022    K 5.0 05/31/2022    CREATININE 1.3 (H) 05/31/2022     Lab Results   Component Value Date    WBC 6.9 05/31/2022    HGB 13.6 05/31/2022     05/31/2022     Lab Results   Component Value Date    ALT 9 (L) 05/31/2022    AST 19 05/31/2022    ALKPHOS 72 05/31/2022    BILITOT 0.4 05/31/2022     TSH (uIU/mL)   Date Value   05/31/2022 1.59     Lab Results   Component Value Date    GLUCOSE 105 (H) 05/31/2022     Lab Results   Component Value Date    LABA1C 5.9 05/31/2022     Objective:   PHYSICAL EXAM   /82 (Site: Left Upper Arm, Position: Sitting, Cuff Size: Medium Adult)   Pulse 84   Resp 12   Ht 5' 6\" (1.676 m)   Wt 150 lb (68 kg)   SpO2 96%   BMI 24.21 kg/m²   BP Readings from Last 5 Encounters:   06/15/22 134/82   05/31/22 104/60   10/06/21 126/76   12/04/19 (!) 154/86   07/25/19 122/82     Wt Readings from Last 5 Encounters:   06/15/22 150 lb (68 kg)   06/07/22 148 lb (67.1 kg)   05/31/22 152 lb 12.8 oz (69.3 kg)   10/06/21 167 lb (75.8 kg)   12/04/19 173 lb 6.4 oz (78.7 kg)      GENERAL:   · well-developed, well-nourished, alert, no distress.      LUNGS:    · Breathing unlabored  · clear to auscultation bilaterally and good air movement  CARDIOVASC:   · regular rate and rhythm  SKIN: warm and dry

## 2022-06-15 NOTE — PATIENT INSTRUCTIONS
Patient has palpable mass under right axilla  Recommendation is excision of the 2 cysts  Surgical procedure was explained  Risks and benefits of surgery were explained  Consent for surgery was signed for excision right axillary mass x 2
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
## 2022-06-15 NOTE — PROGRESS NOTES
Biopsy canceled per Dr Nancy Larios. Pt and neighbor Raeann Pedrazaond transported to Dr Thea Hendrickson for consult.

## 2022-06-15 NOTE — PATIENT INSTRUCTIONS
INSTRUCTIONS  NEXT APPOINTMENT: Please schedule check-up in 1 month. · PLEASE TAKE THIS FORM TO CHECK-OUT WINDOW TO SCHEDULE NEXT VISIT. May take Mucinex (guaifenisen) and Robitussin DM (guafenisen, dextromethorphan) for cough and congestion. May also try Vicks Vaporub. · Breast biopsy today.

## 2022-07-18 ENCOUNTER — TELEPHONE (OUTPATIENT)
Dept: PULMONOLOGY | Age: 82
End: 2022-07-18

## 2022-07-18 ENCOUNTER — OFFICE VISIT (OUTPATIENT)
Dept: PULMONOLOGY | Age: 82
End: 2022-07-18
Payer: MEDICARE

## 2022-07-18 VITALS
HEART RATE: 94 BPM | OXYGEN SATURATION: 91 % | WEIGHT: 143.6 LBS | SYSTOLIC BLOOD PRESSURE: 115 MMHG | TEMPERATURE: 97.2 F | DIASTOLIC BLOOD PRESSURE: 60 MMHG | BODY MASS INDEX: 23.08 KG/M2 | HEIGHT: 66 IN | RESPIRATION RATE: 21 BRPM

## 2022-07-18 DIAGNOSIS — R91.1 PULMONARY NODULE: ICD-10-CM

## 2022-07-18 DIAGNOSIS — R05.9 COUGH: Primary | ICD-10-CM

## 2022-07-18 DIAGNOSIS — Z72.0 TOBACCO ABUSE: ICD-10-CM

## 2022-07-18 DIAGNOSIS — J31.0 CHRONIC RHINITIS: ICD-10-CM

## 2022-07-18 PROCEDURE — 99204 OFFICE O/P NEW MOD 45 MIN: CPT | Performed by: INTERNAL MEDICINE

## 2022-07-18 PROCEDURE — 1123F ACP DISCUSS/DSCN MKR DOCD: CPT | Performed by: INTERNAL MEDICINE

## 2022-07-18 RX ORDER — BROMPHENIRAMINE MALEATE, PSEUDOEPHEDRINE HYDROCHLORIDE, AND DEXTROMETHORPHAN HYDROBROMIDE 2; 30; 10 MG/5ML; MG/5ML; MG/5ML
5 SYRUP ORAL 4 TIMES DAILY PRN
Qty: 118 ML | Refills: 0 | Status: SHIPPED | OUTPATIENT
Start: 2022-07-18 | End: 2022-08-16

## 2022-07-18 ASSESSMENT — COPD QUESTIONNAIRES
QUESTION4_WALKINCLINE: 5
QUESTION7_SLEEPQUALITY: 1
QUESTION3_CHESTTIGHTNESS: 5
QUESTION1_COUGHFREQUENCY: 5
QUESTION2_CHESTPHLEGM: 5
QUESTION8_ENERGYLEVEL: 4
CAT_TOTALSCORE: 35
QUESTION6_LEAVINGHOUSE: 5
QUESTION5_HOMEACTIVITIES: 5

## 2022-07-18 NOTE — TELEPHONE ENCOUNTER
Patient has to cancel appointment for today due to a ride situation and having to rely on neighbors. She will be in the building 7/21 to see Dr. Jarek Shell. She would like to see if she  could see Dr. Stefania Stallworth that morning possibly around 11?  Please Advise
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
## 2022-07-19 PROBLEM — R05.9 COUGH: Status: ACTIVE | Noted: 2022-07-19

## 2022-07-19 NOTE — ASSESSMENT & PLAN NOTE
She currently complains of cough with chest congestion. With a history, this is likely secondary to chronic rhinitis with postnasal drip leading to cough rather than other etiologies such as chronic bronchitis. Assess chest physiology further with CT chest.  Chest x-ray was negative. Exam normal.    In the meantime, start Bromfed as a trial medication. If cough and congestion significantly improved, start Flonase. Discussed with neighbor/caregiver he will either buy it over-the-counter or ask for a prescription.

## 2022-07-25 ENCOUNTER — TELEPHONE (OUTPATIENT)
Dept: BREAST CENTER | Age: 82
End: 2022-07-25

## 2022-07-25 NOTE — TELEPHONE ENCOUNTER
Patient was seen in June and it was recommended to have excision right axillary mass x 2.   Call patient and left message to call office to schedule

## 2022-07-26 NOTE — TELEPHONE ENCOUNTER
Patient returned your call. She said that she would try to call back tomorrow afternoon and would also wait for a call back from shruthi. Stated that she has to have a chest X ray this Friday and not sure when she is ready for surgery but needs to speak to [facility] about getting scheduled.

## 2022-07-29 ENCOUNTER — HOSPITAL ENCOUNTER (OUTPATIENT)
Dept: CT IMAGING | Age: 82
Discharge: HOME | End: 2022-07-29
Payer: MEDICARE

## 2022-07-29 DIAGNOSIS — R05.9 COUGH: ICD-10-CM

## 2022-07-29 PROCEDURE — 71250 CT THORAX DX C-: CPT

## 2022-08-02 ENCOUNTER — TELEPHONE (OUTPATIENT)
Dept: PULMONOLOGY | Age: 82
End: 2022-08-02

## 2022-08-02 DIAGNOSIS — R91.1 PULMONARY NODULE: Primary | ICD-10-CM

## 2022-08-02 NOTE — TELEPHONE ENCOUNTER
ERIS patient/friend and informed them PET scan will be done at [facility] on Tuesday August 9th at 12:30 pm arrival time noon. Patient was given all the instructions and has no further questions at this time.

## 2022-08-02 NOTE — TELEPHONE ENCOUNTER
Patient and friend Horacio Body are calling back to discuss results.    Please call Rosalia's phone number to get ahold of them

## 2022-08-02 NOTE — RESULT ENCOUNTER NOTE
Called and left second message. Again advised biopsy and further work up. I will have to wait for return phone call.

## 2022-08-04 DIAGNOSIS — F32.5 MAJOR DEPRESSIVE DISORDER WITH SINGLE EPISODE, IN FULL REMISSION (HCC): ICD-10-CM

## 2022-08-04 PROBLEM — R22.31 MASS OF RIGHT AXILLA: Status: ACTIVE | Noted: 2022-08-04

## 2022-08-04 PROBLEM — R59.0 HILAR ADENOPATHY: Status: ACTIVE | Noted: 2022-08-04

## 2022-08-04 RX ORDER — SERTRALINE HYDROCHLORIDE 100 MG/1
TABLET, FILM COATED ORAL
Qty: 30 TABLET | Refills: 1 | Status: CANCELLED | OUTPATIENT
Start: 2022-08-04

## 2022-08-04 NOTE — TELEPHONE ENCOUNTER
Patient called in wanting to let Dr. Jade Tavares know that she wants to switch back to ZOLOFT.  She was switched to DESVENLAFAXINE, however she does not like it    Pharmacy verified    Please let patient know if this can be done    Please Advise

## 2022-08-05 NOTE — TELEPHONE ENCOUNTER
Patient is scheduled for PET scan on 8/9/2022. Will wait for results before follow up .       Thanks, Mikayla Adler

## 2022-08-08 RX ORDER — SERTRALINE HYDROCHLORIDE 100 MG/1
TABLET, FILM COATED ORAL
Qty: 30 TABLET | Refills: 1 | Status: SHIPPED | OUTPATIENT
Start: 2022-08-08

## 2022-08-09 ENCOUNTER — HOSPITAL ENCOUNTER (OUTPATIENT)
Dept: PET IMAGING | Age: 82
Discharge: HOME | End: 2022-08-09
Payer: MEDICARE

## 2022-08-09 DIAGNOSIS — R91.1 PULMONARY NODULE: ICD-10-CM

## 2022-08-09 PROCEDURE — 78815 PET IMAGE W/CT SKULL-THIGH: CPT

## 2022-08-09 PROCEDURE — A9552 F18 FDG: HCPCS | Performed by: INTERNAL MEDICINE

## 2022-08-09 PROCEDURE — 3430000000 HC RX DIAGNOSTIC RADIOPHARMACEUTICAL: Performed by: INTERNAL MEDICINE

## 2022-08-09 RX ORDER — FLUDEOXYGLUCOSE F 18 200 MCI/ML
15.11 INJECTION, SOLUTION INTRAVENOUS
Status: COMPLETED | OUTPATIENT
Start: 2022-08-09 | End: 2022-08-09

## 2022-08-09 RX ADMIN — FLUDEOXYGLUCOSE F 18 15.11 MILLICURIE: 200 INJECTION, SOLUTION INTRAVENOUS at 12:34

## 2022-08-11 ENCOUNTER — TELEPHONE (OUTPATIENT)
Dept: PULMONOLOGY | Age: 82
End: 2022-08-11

## 2022-08-11 ENCOUNTER — TELEPHONE (OUTPATIENT)
Dept: FAMILY MEDICINE CLINIC | Age: 82
End: 2022-08-11

## 2022-08-11 DIAGNOSIS — R59.9 ADENOPATHY: Primary | ICD-10-CM

## 2022-08-11 NOTE — TELEPHONE ENCOUNTER
Attempted to schedule. Patient wants to wait till her appointment on Aug 18 to see Dr. Seb Diane.     COMPA

## 2022-08-11 NOTE — TELEPHONE ENCOUNTER
She was supposed to follow up last month  I see that weight loss was discussed but not nausea. Concerned that she is feeling weak. Can she come in for appt tmrw?

## 2022-08-11 NOTE — TELEPHONE ENCOUNTER
FRIEND IS CALLING   PT HAS HAD BEEN NAUSEA A FEW WEEKS- TALKED TO DR. SPENCER ABOUT THIS THE LAST TIME SHE WAS SEEN  SHE IS NOT THROWING UP  SHE IS ABLE TO EAT AND DRINK BUT SHE IS HAVING A TROUBLE SWALLOWING  SHE IS WEAK   HAS BEEN HAVING LUNG ISSUES AND GETTING TESTS DONE     WANTS TO KNOW IF SHE CAN GET SOME NAUSEA MEDS     PHARM CONFIRM  Vinayak Bathe

## 2022-08-11 NOTE — TELEPHONE ENCOUNTER
Patient's friend Meryle Beecham is requesting a call from Dr. Constantino Leyva. She has many questions about biopsy.        Please call her on her cell phone

## 2022-08-11 NOTE — TELEPHONE ENCOUNTER
Patient scheduled for August 19th 2022 at 9:30 am arrival time will be 8:00 am due to her being on Aspirin. Dr. Mike Baca,  Patient will need a stat PT, PTT, INR, and or platelet count due to being on aspirin. Esteban Gay MD Physician Signed      2:48 PM       Copy           Called  and left message about pet positive results. Cleared by IR for US biopsy of right. Ordered.

## 2022-08-14 NOTE — TELEPHONE ENCOUNTER
Called and answered questions. She is progressing. biopsy scheduled Friday. She is getting progressively worse with weakness. Advised ER / hospitalization is likely be best course of action.       Advised she likely needs hospice but this will help to expedite  this

## 2022-08-16 ENCOUNTER — APPOINTMENT (OUTPATIENT)
Dept: CT IMAGING | Age: 82
DRG: 988 | End: 2022-08-16
Payer: MEDICARE

## 2022-08-16 ENCOUNTER — APPOINTMENT (OUTPATIENT)
Dept: GENERAL RADIOLOGY | Age: 82
DRG: 988 | End: 2022-08-16
Payer: MEDICARE

## 2022-08-16 ENCOUNTER — HOSPITAL ENCOUNTER (INPATIENT)
Age: 82
LOS: 3 days | Discharge: HOME HEALTH | DRG: 988 | End: 2022-08-19
Attending: EMERGENCY MEDICINE | Admitting: INTERNAL MEDICINE
Payer: MEDICARE

## 2022-08-16 DIAGNOSIS — R91.8 MASS OF RIGHT LUNG: Primary | ICD-10-CM

## 2022-08-16 DIAGNOSIS — I51.3 LEFT ATRIAL THROMBUS: ICD-10-CM

## 2022-08-16 LAB
ALBUMIN SERPL-MCNC: 3.6 G/DL (ref 3.4–5)
ALP BLD-CCNC: 60 U/L (ref 40–129)
ALT SERPL-CCNC: 14 U/L (ref 10–40)
ANION GAP SERPL CALCULATED.3IONS-SCNC: 13 MMOL/L (ref 3–16)
AST SERPL-CCNC: 20 U/L (ref 15–37)
BASOPHILS ABSOLUTE: 0.1 K/UL (ref 0–0.2)
BASOPHILS RELATIVE PERCENT: 0.8 %
BILIRUB SERPL-MCNC: 0.4 MG/DL (ref 0–1)
BILIRUBIN DIRECT: <0.2 MG/DL (ref 0–0.3)
BILIRUBIN, INDIRECT: NORMAL MG/DL (ref 0–1)
BUN BLDV-MCNC: 14 MG/DL (ref 7–20)
CALCIUM SERPL-MCNC: 9.5 MG/DL (ref 8.3–10.6)
CHLORIDE BLD-SCNC: 99 MMOL/L (ref 99–110)
CO2: 22 MMOL/L (ref 21–32)
CREAT SERPL-MCNC: 0.9 MG/DL (ref 0.6–1.2)
EOSINOPHILS ABSOLUTE: 0.2 K/UL (ref 0–0.6)
EOSINOPHILS RELATIVE PERCENT: 3.1 %
GFR AFRICAN AMERICAN: >60
GFR NON-AFRICAN AMERICAN: 60
GLUCOSE BLD-MCNC: 99 MG/DL (ref 70–99)
HCT VFR BLD CALC: 39.4 % (ref 36–48)
HEMOGLOBIN: 13.6 G/DL (ref 12–16)
LIPASE: 88 U/L (ref 13–60)
LYMPHOCYTES ABSOLUTE: 0.9 K/UL (ref 1–5.1)
LYMPHOCYTES RELATIVE PERCENT: 12.3 %
MCH RBC QN AUTO: 33.7 PG (ref 26–34)
MCHC RBC AUTO-ENTMCNC: 34.4 G/DL (ref 31–36)
MCV RBC AUTO: 97.8 FL (ref 80–100)
MONOCYTES ABSOLUTE: 0.7 K/UL (ref 0–1.3)
MONOCYTES RELATIVE PERCENT: 9.4 %
NEUTROPHILS ABSOLUTE: 5.3 K/UL (ref 1.7–7.7)
NEUTROPHILS RELATIVE PERCENT: 74.4 %
PDW BLD-RTO: 14 % (ref 12.4–15.4)
PLATELET # BLD: 208 K/UL (ref 135–450)
PMV BLD AUTO: 7.4 FL (ref 5–10.5)
POTASSIUM REFLEX MAGNESIUM: 4.6 MMOL/L (ref 3.5–5.1)
PRO-BNP: 296 PG/ML (ref 0–449)
RBC # BLD: 4.03 M/UL (ref 4–5.2)
SODIUM BLD-SCNC: 134 MMOL/L (ref 136–145)
TOTAL PROTEIN: 6.7 G/DL (ref 6.4–8.2)
TROPONIN: <0.01 NG/ML
WBC # BLD: 7.1 K/UL (ref 4–11)

## 2022-08-16 PROCEDURE — 1200000000 HC SEMI PRIVATE

## 2022-08-16 PROCEDURE — 83690 ASSAY OF LIPASE: CPT

## 2022-08-16 PROCEDURE — 71045 X-RAY EXAM CHEST 1 VIEW: CPT

## 2022-08-16 PROCEDURE — 83880 ASSAY OF NATRIURETIC PEPTIDE: CPT

## 2022-08-16 PROCEDURE — 84484 ASSAY OF TROPONIN QUANT: CPT

## 2022-08-16 PROCEDURE — 93005 ELECTROCARDIOGRAM TRACING: CPT | Performed by: PHYSICIAN ASSISTANT

## 2022-08-16 PROCEDURE — 6370000000 HC RX 637 (ALT 250 FOR IP): Performed by: PHYSICIAN ASSISTANT

## 2022-08-16 PROCEDURE — 6360000004 HC RX CONTRAST MEDICATION: Performed by: PHYSICIAN ASSISTANT

## 2022-08-16 PROCEDURE — 71260 CT THORAX DX C+: CPT | Performed by: PHYSICIAN ASSISTANT

## 2022-08-16 PROCEDURE — 80076 HEPATIC FUNCTION PANEL: CPT

## 2022-08-16 PROCEDURE — 80048 BASIC METABOLIC PNL TOTAL CA: CPT

## 2022-08-16 PROCEDURE — 85025 COMPLETE CBC W/AUTO DIFF WBC: CPT

## 2022-08-16 PROCEDURE — 2060000000 HC ICU INTERMEDIATE R&B

## 2022-08-16 PROCEDURE — 99285 EMERGENCY DEPT VISIT HI MDM: CPT

## 2022-08-16 RX ORDER — FAMOTIDINE 20 MG/1
20 TABLET, FILM COATED ORAL DAILY
Status: DISCONTINUED | OUTPATIENT
Start: 2022-08-17 | End: 2022-08-17

## 2022-08-16 RX ORDER — SODIUM CHLORIDE 9 MG/ML
INJECTION, SOLUTION INTRAVENOUS PRN
Status: DISCONTINUED | OUTPATIENT
Start: 2022-08-16 | End: 2022-08-19 | Stop reason: HOSPADM

## 2022-08-16 RX ORDER — ASPIRIN 81 MG/1
81 TABLET ORAL DAILY
Status: DISCONTINUED | OUTPATIENT
Start: 2022-08-17 | End: 2022-08-19 | Stop reason: HOSPADM

## 2022-08-16 RX ORDER — CARVEDILOL 6.25 MG/1
3.12 TABLET ORAL 2 TIMES DAILY WITH MEALS
Status: DISCONTINUED | OUTPATIENT
Start: 2022-08-17 | End: 2022-08-19 | Stop reason: HOSPADM

## 2022-08-16 RX ORDER — FAMOTIDINE 20 MG/1
20 TABLET, FILM COATED ORAL DAILY
COMMUNITY

## 2022-08-16 RX ORDER — POLYETHYLENE GLYCOL 3350 17 G/17G
17 POWDER, FOR SOLUTION ORAL DAILY PRN
Status: DISCONTINUED | OUTPATIENT
Start: 2022-08-16 | End: 2022-08-19 | Stop reason: HOSPADM

## 2022-08-16 RX ORDER — ACETAMINOPHEN 650 MG/1
650 SUPPOSITORY RECTAL EVERY 4 HOURS PRN
Status: DISCONTINUED | OUTPATIENT
Start: 2022-08-16 | End: 2022-08-19 | Stop reason: HOSPADM

## 2022-08-16 RX ORDER — SODIUM CHLORIDE 0.9 % (FLUSH) 0.9 %
10 SYRINGE (ML) INJECTION EVERY 12 HOURS SCHEDULED
Status: DISCONTINUED | OUTPATIENT
Start: 2022-08-16 | End: 2022-08-19 | Stop reason: HOSPADM

## 2022-08-16 RX ORDER — ACETAMINOPHEN 325 MG/1
650 TABLET ORAL ONCE
Status: COMPLETED | OUTPATIENT
Start: 2022-08-16 | End: 2022-08-16

## 2022-08-16 RX ORDER — ONDANSETRON 2 MG/ML
4 INJECTION INTRAMUSCULAR; INTRAVENOUS EVERY 4 HOURS PRN
Status: DISCONTINUED | OUTPATIENT
Start: 2022-08-16 | End: 2022-08-19 | Stop reason: HOSPADM

## 2022-08-16 RX ORDER — SODIUM CHLORIDE 0.9 % (FLUSH) 0.9 %
10 SYRINGE (ML) INJECTION PRN
Status: DISCONTINUED | OUTPATIENT
Start: 2022-08-16 | End: 2022-08-19 | Stop reason: HOSPADM

## 2022-08-16 RX ORDER — ACETAMINOPHEN 325 MG/1
650 TABLET ORAL EVERY 4 HOURS PRN
Status: DISCONTINUED | OUTPATIENT
Start: 2022-08-16 | End: 2022-08-19 | Stop reason: HOSPADM

## 2022-08-16 RX ORDER — ENOXAPARIN SODIUM 100 MG/ML
40 INJECTION SUBCUTANEOUS DAILY
Status: DISCONTINUED | OUTPATIENT
Start: 2022-08-17 | End: 2022-08-17

## 2022-08-16 RX ADMIN — ACETAMINOPHEN 650 MG: 325 TABLET ORAL at 16:13

## 2022-08-16 RX ADMIN — IOPAMIDOL 75 ML: 755 INJECTION, SOLUTION INTRAVENOUS at 17:00

## 2022-08-16 ASSESSMENT — PAIN - FUNCTIONAL ASSESSMENT: PAIN_FUNCTIONAL_ASSESSMENT: 0-10

## 2022-08-16 ASSESSMENT — ENCOUNTER SYMPTOMS
COUGH: 1
NAUSEA: 0
EYES NEGATIVE: 1
SHORTNESS OF BREATH: 1
ABDOMINAL PAIN: 0
VOMITING: 0

## 2022-08-16 ASSESSMENT — PAIN DESCRIPTION - LOCATION: LOCATION: BACK

## 2022-08-16 ASSESSMENT — PAIN DESCRIPTION - ORIENTATION: ORIENTATION: MID

## 2022-08-16 ASSESSMENT — PAIN SCALES - GENERAL
PAINLEVEL_OUTOF10: 0
PAINLEVEL_OUTOF10: 0
PAINLEVEL_OUTOF10: 5
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
## 2022-08-16 NOTE — ED PROVIDER NOTES
Attending Supervisory Note/Shared Visit   I have personally performed a face to face diagnostic evaluation on this patient. I have reviewed the mid-levels findings and agree. History and Exam by me shows an alert white female in no acute distress. Recently diagnosed with lung cancer. Followed by pulmonology. Increasing fatigue and intermittent shortness of breath over the last 2 weeks. No chest pain. General: Alert elderly female no acute distress. HEENT: Conjunctiva are clear. No pallor. Pupils equal round reactive. Oropharynx negative. Neck: Supple, nontender, no adenopathy. Heart: Regular rate and rhythm. No murmurs or gallops noted. Lungs: Breath sounds equal bilaterally and clear. Abdomen: Soft, nondistended, nontender. Musculoskeletal: No lower extremity edema. No calf tenderness. Lab reviewed. H&H are normal.  White blood cell count 7100 with 74 neutrophils and 12 lymphs. Sodium 134 with a potassium 4.6. BUN of 14 with a creatinine 0.9. Glucose of 99. BNP of 296. Troponin less than 0.01. Liver enzymes are normal.  Lipase of 88. CT chest pulmonary embolism with contrast: No evidence of acute pulmonary embolism. Large right hilar mass which encases the pulmonary arteries and veins and obliterates the right lobe pulmonary vein. There is invasion into the left atrium with tumor thrombus seen in the right aspect of the left atrium. Extensive mediastinal adenopathy. Postobstructive changes in the right upper lobe. Multiple pulmonary nodules with the most prominent in the right upper lobe measuring up to 1.9 cm in the superior segment of the right lower lobe measuring 1.5 cm. Multiple subcutaneous nodules also likely representing metastatic disease. Cardiothoracic was consulted. They deferred to the hospitalist to admit and decide about anticoagulation. Hospitalist was consulted for admission.     Diagnosis: Left atrial thrombus  Right lung mass    Disposition: Admit for further evaluation and treatment.       (Please note that portions of this note were completed with a voice recognition program.  Efforts were made to edit the dictations but occasionally words are mis-transcribed.)    Nina De Santiago MD  Attending Emergency Physician        Darryle Punter, MD  08/16/22 7316

## 2022-08-16 NOTE — ED NOTES
Pt O2 saturation while sitting 95% on RA, HR 88. On ambulation pt O2 sat on RA 96%, HR 89. Will notify EDPA.       Anna Curran RN  08/16/22 9714

## 2022-08-17 PROBLEM — C34.90 LUNG CANCER (HCC): Status: ACTIVE | Noted: 2022-08-17

## 2022-08-17 PROBLEM — R13.10 DYSPHAGIA: Status: ACTIVE | Noted: 2022-08-17

## 2022-08-17 PROBLEM — I51.89 LEFT ATRIAL MASS: Status: ACTIVE | Noted: 2022-08-17

## 2022-08-17 PROBLEM — R53.83 FATIGUE: Status: ACTIVE | Noted: 2022-08-17

## 2022-08-17 PROBLEM — R91.8 MASS OF RIGHT LUNG: Status: ACTIVE | Noted: 2022-08-17

## 2022-08-17 PROBLEM — R63.4 WEIGHT LOSS, UNINTENTIONAL: Status: ACTIVE | Noted: 2022-08-17

## 2022-08-17 PROBLEM — R06.02 SHORTNESS OF BREATH: Status: ACTIVE | Noted: 2022-08-17

## 2022-08-17 LAB
ANION GAP SERPL CALCULATED.3IONS-SCNC: 13 MMOL/L (ref 3–16)
APTT: 27.8 SEC (ref 23–34.3)
APTT: >180 SEC (ref 23–34.3)
BASOPHILS ABSOLUTE: 0.1 K/UL (ref 0–0.2)
BASOPHILS RELATIVE PERCENT: 0.8 %
BUN BLDV-MCNC: 13 MG/DL (ref 7–20)
CALCIUM SERPL-MCNC: 9.3 MG/DL (ref 8.3–10.6)
CHLORIDE BLD-SCNC: 98 MMOL/L (ref 99–110)
CO2: 22 MMOL/L (ref 21–32)
CREAT SERPL-MCNC: 0.9 MG/DL (ref 0.6–1.2)
EKG ATRIAL RATE: 96 BPM
EKG DIAGNOSIS: NORMAL
EKG P AXIS: 35 DEGREES
EKG P-R INTERVAL: 158 MS
EKG Q-T INTERVAL: 386 MS
EKG QRS DURATION: 82 MS
EKG QTC CALCULATION (BAZETT): 487 MS
EKG R AXIS: -60 DEGREES
EKG T AXIS: 65 DEGREES
EKG VENTRICULAR RATE: 96 BPM
EOSINOPHILS ABSOLUTE: 0.2 K/UL (ref 0–0.6)
EOSINOPHILS RELATIVE PERCENT: 2.7 %
GFR AFRICAN AMERICAN: >60
GFR NON-AFRICAN AMERICAN: 60
GLUCOSE BLD-MCNC: 94 MG/DL (ref 70–99)
HCT VFR BLD CALC: 38.3 % (ref 36–48)
HEMOGLOBIN: 13.1 G/DL (ref 12–16)
LYMPHOCYTES ABSOLUTE: 0.8 K/UL (ref 1–5.1)
LYMPHOCYTES RELATIVE PERCENT: 11.5 %
MCH RBC QN AUTO: 33.6 PG (ref 26–34)
MCHC RBC AUTO-ENTMCNC: 34.3 G/DL (ref 31–36)
MCV RBC AUTO: 97.8 FL (ref 80–100)
MONOCYTES ABSOLUTE: 0.6 K/UL (ref 0–1.3)
MONOCYTES RELATIVE PERCENT: 8.3 %
NEUTROPHILS ABSOLUTE: 5.4 K/UL (ref 1.7–7.7)
NEUTROPHILS RELATIVE PERCENT: 76.7 %
PDW BLD-RTO: 14 % (ref 12.4–15.4)
PLATELET # BLD: 191 K/UL (ref 135–450)
PMV BLD AUTO: 7.1 FL (ref 5–10.5)
POTASSIUM REFLEX MAGNESIUM: 4.3 MMOL/L (ref 3.5–5.1)
RBC # BLD: 3.92 M/UL (ref 4–5.2)
SODIUM BLD-SCNC: 133 MMOL/L (ref 136–145)
WBC # BLD: 7 K/UL (ref 4–11)

## 2022-08-17 PROCEDURE — 93010 ELECTROCARDIOGRAM REPORT: CPT | Performed by: INTERNAL MEDICINE

## 2022-08-17 PROCEDURE — 6360000002 HC RX W HCPCS: Performed by: INTERNAL MEDICINE

## 2022-08-17 PROCEDURE — 2580000003 HC RX 258: Performed by: INTERNAL MEDICINE

## 2022-08-17 PROCEDURE — 6370000000 HC RX 637 (ALT 250 FOR IP): Performed by: INTERNAL MEDICINE

## 2022-08-17 PROCEDURE — 80048 BASIC METABOLIC PNL TOTAL CA: CPT

## 2022-08-17 PROCEDURE — 99223 1ST HOSP IP/OBS HIGH 75: CPT | Performed by: NURSE PRACTITIONER

## 2022-08-17 PROCEDURE — 99223 1ST HOSP IP/OBS HIGH 75: CPT | Performed by: INTERNAL MEDICINE

## 2022-08-17 PROCEDURE — 85730 THROMBOPLASTIN TIME PARTIAL: CPT

## 2022-08-17 PROCEDURE — 85025 COMPLETE CBC W/AUTO DIFF WBC: CPT

## 2022-08-17 PROCEDURE — 94760 N-INVAS EAR/PLS OXIMETRY 1: CPT

## 2022-08-17 PROCEDURE — 36415 COLL VENOUS BLD VENIPUNCTURE: CPT

## 2022-08-17 PROCEDURE — 1200000000 HC SEMI PRIVATE

## 2022-08-17 PROCEDURE — 2500000003 HC RX 250 WO HCPCS: Performed by: INTERNAL MEDICINE

## 2022-08-17 RX ORDER — HEPARIN SODIUM 1000 [USP'U]/ML
5100 INJECTION, SOLUTION INTRAVENOUS; SUBCUTANEOUS ONCE
Status: COMPLETED | OUTPATIENT
Start: 2022-08-17 | End: 2022-08-17

## 2022-08-17 RX ORDER — HEPARIN SODIUM 10000 [USP'U]/100ML
0-3000 INJECTION, SOLUTION INTRAVENOUS CONTINUOUS
Status: DISCONTINUED | OUTPATIENT
Start: 2022-08-17 | End: 2022-08-17

## 2022-08-17 RX ORDER — IPRATROPIUM BROMIDE AND ALBUTEROL SULFATE 2.5; .5 MG/3ML; MG/3ML
1 SOLUTION RESPIRATORY (INHALATION) EVERY 6 HOURS PRN
Status: DISCONTINUED | OUTPATIENT
Start: 2022-08-17 | End: 2022-08-19 | Stop reason: HOSPADM

## 2022-08-17 RX ORDER — PANTOPRAZOLE SODIUM 40 MG/1
40 TABLET, DELAYED RELEASE ORAL
Status: DISCONTINUED | OUTPATIENT
Start: 2022-08-17 | End: 2022-08-19 | Stop reason: HOSPADM

## 2022-08-17 RX ADMIN — ONDANSETRON 4 MG: 2 INJECTION INTRAMUSCULAR; INTRAVENOUS at 10:05

## 2022-08-17 RX ADMIN — HEPARIN SODIUM 1150 UNITS/HR: 10000 INJECTION, SOLUTION INTRAVENOUS at 03:52

## 2022-08-17 RX ADMIN — HEPARIN SODIUM 5100 UNITS: 1000 INJECTION INTRAVENOUS; SUBCUTANEOUS at 03:44

## 2022-08-17 RX ADMIN — ASPIRIN 81 MG: 81 TABLET, COATED ORAL at 10:06

## 2022-08-17 RX ADMIN — SERTRALINE 100 MG: 50 TABLET, FILM COATED ORAL at 10:06

## 2022-08-17 RX ADMIN — PANTOPRAZOLE SODIUM 40 MG: 40 TABLET, DELAYED RELEASE ORAL at 17:04

## 2022-08-17 RX ADMIN — CARVEDILOL 3.12 MG: 6.25 TABLET, FILM COATED ORAL at 17:03

## 2022-08-17 RX ADMIN — CARVEDILOL 3.12 MG: 6.25 TABLET, FILM COATED ORAL at 10:05

## 2022-08-17 RX ADMIN — SODIUM CHLORIDE, PRESERVATIVE FREE 10 ML: 5 INJECTION INTRAVENOUS at 10:06

## 2022-08-17 RX ADMIN — SODIUM CHLORIDE, PRESERVATIVE FREE 10 ML: 5 INJECTION INTRAVENOUS at 00:12

## 2022-08-17 RX ADMIN — SODIUM CHLORIDE, PRESERVATIVE FREE 10 ML: 5 INJECTION INTRAVENOUS at 20:19

## 2022-08-17 RX ADMIN — FAMOTIDINE 20 MG: 20 TABLET, FILM COATED ORAL at 10:05

## 2022-08-17 ASSESSMENT — PAIN SCALES - GENERAL
PAINLEVEL_OUTOF10: 0
PAINLEVEL_OUTOF10: 0

## 2022-08-17 NOTE — PROGRESS NOTES
[facility]    Patient last seen by me in 2020  I was asked to see the patient for dysphagia  On careful questioning, the patient denies any dysphagia whatsoever - no trouble with solids, liquids or medications  She does have anorexia and simply does not want to eat  She also has nausea  and a lot of mucus/phlegm production  She has lost 20 pounds recently  Review of imaging studies reveals a right-sided lung cancer with widely metastatic disease    REC:  No endoscopic evaluation is indicated  Her symptoms appear to be related to her neoplasm with metastatic disease  Acid suppression  Please call if any new GI problems or questions
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
## 2022-08-17 NOTE — CONSULTS
Clinical Pharmacy Note  Heparin Dosing       Lab Results   Component Value Date/Time    APTT 27.8 08/17/2022 02:55 AM     Lab Results   Component Value Date/Time    HGB 13.6 08/16/2022 03:49 PM    HCT 39.4 08/16/2022 03:49 PM     08/16/2022 03:49 PM       Plan:  Initial bolus: 5100 units  Initial infusion rate: 1150 units/hr  Next aPTT: 1000 8/17/22    Pharmacy will continue to monitor and adjust based on aPTT results.   Patricia Garcia, PharmD

## 2022-08-17 NOTE — PROGRESS NOTES
CVTS    Consulted regarding LA thrombus. Chart and imaging reviewed. Thrombus associated with advanced presumably malignant process which does not appear resectable. Agree with anticoagulation. Suggest biopsy before oral anticoagulant started - it seems EBUS has been planned. Otherwise there is a subcutaneous nodule L chest that may be part of the same process and appears fairly easily accessible. Full note to follow.     Aron Salguero MD  8/17/2022  6:57 AM
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
## 2022-08-17 NOTE — ED NOTES
Report called to [facility] on [facility]. All questions answered.      Pierre Robin RN  08/17/22 [id number]

## 2022-08-17 NOTE — PROGRESS NOTES
Pharmacy Medication Reconciliation Note     List of medications Dax Larson is currently taking is complete. Source of information:   1. Conversation with patient at bedside  2. EMR    Notes regarding home medications:   1. Patient reports taking nothing but APAP PTA in the ED  2.  Daughter in room reports patient is \"night owl\" and takes first dose of meds in later afternoon/evening when she wakes up    Patient denies taking any OTC or herbal medications other than those listed     Laura Hernandez, Pharmacy Intern  8/16/2022  8:33 PM

## 2022-08-17 NOTE — ACP (ADVANCE CARE PLANNING)
Advance Care Planning   The patient has the following advanced directives on file:  Advance Directives       Power of [address] ACP-Advance Directive ACP-Power of     Not on File Not on File Not on File Not on File            The patient has appointed the following active healthcare agents:    Primary Decision Maker: Maria A Rivera - [phone]    The Patient has the following current code status:    Code Status: DNR-CCA    Visit Documentation:  I discussed Advance Care Planning with Caryle Levin today which included the patient's choices for care and treatment in the case of a health event that adversely affects decision-making abilities. She stated the Advance Care Directives on file are current. We discussed her current values, goals and care preferences at the end of life. Caryle Levin has no questions at this time and has agreed to keep me up-to-date should anything change. 1. Goals of medical treatment were reviewed . 2. Patient's stated goal/treatment preference is she would not like to be resuscitated, patient understands she has metastatic cancer. 3. Others present during the discussion none  4. The discussion lasted 20 minutes.     Jody Luu MD  8/17/2022
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
## 2022-08-17 NOTE — ACP (ADVANCE CARE PLANNING)
Advance Care Planning     Advance Care Planning Inpatient Note  [facility] Department    Today's Date: 8/17/2022  Unit: [facility] 5W PROGRESSIVE CARE    Received request from IDT Member. Upon review of chart and communication with care team, patient's decision making abilities are not in question. . Patient was/were present in the room during visit. Goals of ACP Conversation:  Discuss advance care planning documents    Health Care Decision Makers:       Primary Decision Maker: Demi Avina - [phone]  Summary:  Completed [facility]    Advance Care Planning Documents (Patient Wishes):  Healthcare Power of /Advance Directive Appointment of [address]  Living Will/Advance Directive     Assessment:  Pt awake and alert. No family member. Interventions:  Provided education on documents for clarity and greater understanding  Assisted in the completion of documents according to patient's wishes at this time    Care Preferences Communicated:   No    Outcomes/Plan:  ACP Discussion: Completed  New advance directive completed. Returned original document(s) to patient, as well as copies for distribution to appointed agents  Copy of advance directive given to staff to scan into medical record.     Electronically signed by Gus Gilbert on 8/17/2022 at 2:58 PM

## 2022-08-17 NOTE — PROGRESS NOTES
Hospitalist Progress Note      PCP: Ama Garrison MD    Date of Admission: 8/16/2022    Chief Complaint: shortness of breath. Hospital Course:    80y.o. year-old female with a history of hypertension, hyperlipidemia, COPD, CAD s/p MI, chronic systolic CHF and CKD3. She was recently diagnosed with lung cancer 2 weeks ago. Patient was found to have a large hilar mass invading pulmonary arteries and veins with tumor thrombus in the right aspect of the left atrium. Patient was admitted for further investigation management. Subjective:   Patient reports some nausea, stated that shortness of breath is mostly at exertion, comfortable      Medications:  Reviewed    Infusion Medications    sodium chloride       Scheduled Medications    pantoprazole  40 mg Oral BID AC    sertraline  100 mg Oral Daily    carvedilol  3.125 mg Oral BID WC    aspirin EC  81 mg Oral Daily    sodium chloride flush  10 mL IntraVENous 2 times per day     PRN Meds: ipratropium-albuterol, sodium chloride flush, sodium chloride, ondansetron, polyethylene glycol, acetaminophen **OR** acetaminophen      Intake/Output Summary (Last 24 hours) at 8/17/2022 1535  Last data filed at 8/17/2022 1138  Gross per 24 hour   Intake 240 ml   Output --   Net 240 ml       Physical Exam Performed:    BP (!) 147/70   Pulse 93   Temp 98.4 °F (36.9 °C) (Oral)   Resp 18   Ht 5' 2\" (1.575 m)   Wt 137 lb 5.6 oz (62.3 kg)   SpO2 97%   BMI 25.12 kg/m²     General appearance: No apparent distress, appears stated age and cooperative. HEENT: Pupils equal, round, and reactive to light. Conjunctivae/corneas clear. Neck: Supple, with full range of motion. No jugular venous distention. Trachea midline. Respiratory:  Normal respiratory effort. Clear to auscultation, bilaterally without Rales/Wheezes/Rhonchi.   Large lymph nodes palpable on right axilla 4 x 4 cm as well as left axilla 2 x 3 cm   Cardiovascular: Regular rate and rhythm with normal S1/S2 without murmurs, rubs or gallops. Abdomen: Soft, non-tender, non-distended with normal bowel sounds. Musculoskeletal: No clubbing, cyanosis or edema bilaterally. Full range of motion without deformity. Skin: Skin color, texture, turgor normal.  No rashes or lesions. Neurologic:  Neurovascularly intact without any focal sensory/motor deficits. Cranial nerves: II-XII intact, grossly non-focal.  Psychiatric: Alert and oriented, thought content appropriate, normal insight  Capillary Refill: Brisk,3 seconds, normal   Peripheral Pulses: +2 palpable, equal bilaterally       Labs:   Recent Labs     08/16/22  1549 08/17/22  1052   WBC 7.1 7.0   HGB 13.6 13.1   HCT 39.4 38.3    191     Recent Labs     08/16/22  1549 08/17/22  1052   * 133*   K 4.6 4.3   CL 99 98*   CO2 22 22   BUN 14 13   CREATININE 0.9 0.9   CALCIUM 9.5 9.3     Recent Labs     08/16/22  1549   AST 20   ALT 14   BILIDIR <0.2   BILITOT 0.4   ALKPHOS 60     No results for input(s): INR in the last 72 hours. Recent Labs     08/16/22  1549   TROPONINI <0.01       Urinalysis:    No results found for: Ann Midget, BACTERIA, RBCUA, BLOODU, SPECGRAV, GLUCOSEU    Radiology:  CT CHEST PULMONARY EMBOLISM W CONTRAST   Final Result   1. No evidence of acute pulmonary embolism. 2. Large right hilar mass which encases pulmonary arteries and veins and   obliterates the right upper lobe pulmonary vein. There is invasion into the   left atrium with tumor thrombus seen in the right aspect of the left atrium. Extensive mediastinal lymphadenopathy. 3. Postobstructive changes in the right upper lobe. Multiple pulmonary   nodules with the most prominent in the right upper lobe measuring up to 1.9   cm and the superior segment of the right lower lobe measuring 1.5 cm. 4. Multiple subcutaneous nodules also likely represent metastatic disease. XR CHEST PORTABLE   Final Result   1. Similar appearance of mediastinal lymphadenopathy.       2. Similar appearance of pulmonary nodule in right upper lobe with   surrounding ground-glass attenuation. US BIOPSY LYMPH NODE    (Results Pending)           Assessment/Plan:    Active Hospital Problems    Diagnosis     Hyperlipidemia with target LDL less than 100 [E78.5]      Priority: High    Shortness of breath [R06.02]      Priority: Medium    Fatigue [R53.83]      Priority: Medium    Weight loss, unintentional [R63.4]      Priority: Medium    Dysphagia [R13.10]      Priority: Medium    Lung cancer ([facility] [C34.90]      Priority: Medium    Mass of right lung [R91.8]      Priority: Medium    Left atrial thrombus [I51.3]      Priority: Medium    Stage 3b chronic kidney disease ([facility] [N18.32]      Priority: Medium    Essential hypertension [I10]     Coronary artery disease involving native coronary artery without angina pectoris- cath 12/16 [I25.10]     Chronic systolic congestive heart failure ([facility]- EF 30-35% 12/2016 [I50.22]     Ch[facility] [J44.9]      Lung cancer  Left atrial thrombosis  Patient was diagnosed with lung cancer 2 weeks ago, had not been biopsied yet, was planned for biopsy later this week, presented to emergency with shortness of breath, was found to have invasion of the tumor into the vessels as well as the atrium. Patient was seen by cardiothoracic surgery, no plan for surgical intervention at the time being, case discussed with pulmonology. Will likely biopsy axillary mass since it is accessible. Plan. -Appreciate pulmonology input  -appreciate cardiology input.  -Appreciate oncology input  -continue on heparin infusion  -Tissue biopsy to confirm diagnosis, likely right axillary.      Dysphagia  Seen by gastroenterology, no plan for differential time being    Shortness of breath  Likely secondary to malignancy    CAD  Continue home medications    CHF  EF 30 to 35%, not in exacerbation    Hypertension  Home medication    Hyperlipidemia  New medications    Goals of care   see separate note, code status changes to DNR CCA      DVT Prophylaxis: heparin   Diet: ADULT DIET; Regular;  Low Sodium (2 gm); 2000 ml  Diet NPO  Code Status: DNR-CCA    PT/OT Eval Status: pending clinical improvement      Dispo - pending clinical improvement      Jody Luu MD

## 2022-08-18 ENCOUNTER — APPOINTMENT (OUTPATIENT)
Dept: MRI IMAGING | Age: 82
DRG: 988 | End: 2022-08-18
Payer: MEDICARE

## 2022-08-18 ENCOUNTER — ANTI-COAG VISIT (OUTPATIENT)
Dept: PHARMACY | Age: 82
End: 2022-08-18

## 2022-08-18 ENCOUNTER — APPOINTMENT (OUTPATIENT)
Dept: ULTRASOUND IMAGING | Age: 82
DRG: 988 | End: 2022-08-18
Payer: MEDICARE

## 2022-08-18 PROBLEM — R05.9 COUGH: Status: RESOLVED | Noted: 2022-07-19 | Resolved: 2022-08-18

## 2022-08-18 LAB
ANION GAP SERPL CALCULATED.3IONS-SCNC: 10 MMOL/L (ref 3–16)
BASOPHILS ABSOLUTE: 0 K/UL (ref 0–0.2)
BASOPHILS RELATIVE PERCENT: 0.6 %
BUN BLDV-MCNC: 12 MG/DL (ref 7–20)
CALCIUM SERPL-MCNC: 8.8 MG/DL (ref 8.3–10.6)
CHLORIDE BLD-SCNC: 100 MMOL/L (ref 99–110)
CO2: 22 MMOL/L (ref 21–32)
CREAT SERPL-MCNC: 1 MG/DL (ref 0.6–1.2)
EOSINOPHILS ABSOLUTE: 0.2 K/UL (ref 0–0.6)
EOSINOPHILS RELATIVE PERCENT: 2.4 %
GFR AFRICAN AMERICAN: >60
GFR NON-AFRICAN AMERICAN: 53
GLUCOSE BLD-MCNC: 91 MG/DL (ref 70–99)
HCT VFR BLD CALC: 38.1 % (ref 36–48)
HEMOGLOBIN: 13 G/DL (ref 12–16)
INTERNATIONAL NORMALIZATION RATIO, POC: 1.2
LYMPHOCYTES ABSOLUTE: 0.9 K/UL (ref 1–5.1)
LYMPHOCYTES RELATIVE PERCENT: 12.8 %
MCH RBC QN AUTO: 33 PG (ref 26–34)
MCHC RBC AUTO-ENTMCNC: 34 G/DL (ref 31–36)
MCV RBC AUTO: 97 FL (ref 80–100)
MONOCYTES ABSOLUTE: 0.8 K/UL (ref 0–1.3)
MONOCYTES RELATIVE PERCENT: 10.7 %
NEUTROPHILS ABSOLUTE: 5.3 K/UL (ref 1.7–7.7)
NEUTROPHILS RELATIVE PERCENT: 73.5 %
PDW BLD-RTO: 14 % (ref 12.4–15.4)
PLATELET # BLD: 198 K/UL (ref 135–450)
PMV BLD AUTO: 7.4 FL (ref 5–10.5)
POTASSIUM REFLEX MAGNESIUM: 4.1 MMOL/L (ref 3.5–5.1)
RBC # BLD: 3.93 M/UL (ref 4–5.2)
SODIUM BLD-SCNC: 132 MMOL/L (ref 136–145)
WBC # BLD: 7.3 K/UL (ref 4–11)

## 2022-08-18 PROCEDURE — 85610 PROTHROMBIN TIME: CPT

## 2022-08-18 PROCEDURE — 80048 BASIC METABOLIC PNL TOTAL CA: CPT

## 2022-08-18 PROCEDURE — 6360000004 HC RX CONTRAST MEDICATION: Performed by: FAMILY MEDICINE

## 2022-08-18 PROCEDURE — 6370000000 HC RX 637 (ALT 250 FOR IP): Performed by: INTERNAL MEDICINE

## 2022-08-18 PROCEDURE — 36415 COLL VENOUS BLD VENIPUNCTURE: CPT

## 2022-08-18 PROCEDURE — 88341 IMHCHEM/IMCYTCHM EA ADD ANTB: CPT

## 2022-08-18 PROCEDURE — 07B53ZX EXCISION OF RIGHT AXILLARY LYMPHATIC, PERCUTANEOUS APPROACH, DIAGNOSTIC: ICD-10-PCS | Performed by: STUDENT IN AN ORGANIZED HEALTH CARE EDUCATION/TRAINING PROGRAM

## 2022-08-18 PROCEDURE — 70553 MRI BRAIN STEM W/O & W/DYE: CPT

## 2022-08-18 PROCEDURE — 76942 ECHO GUIDE FOR BIOPSY: CPT

## 2022-08-18 PROCEDURE — 88342 IMHCHEM/IMCYTCHM 1ST ANTB: CPT

## 2022-08-18 PROCEDURE — 1200000000 HC SEMI PRIVATE

## 2022-08-18 PROCEDURE — 99233 SBSQ HOSP IP/OBS HIGH 50: CPT | Performed by: INTERNAL MEDICINE

## 2022-08-18 PROCEDURE — 94760 N-INVAS EAR/PLS OXIMETRY 1: CPT

## 2022-08-18 PROCEDURE — 85025 COMPLETE CBC W/AUTO DIFF WBC: CPT

## 2022-08-18 PROCEDURE — 2580000003 HC RX 258: Performed by: INTERNAL MEDICINE

## 2022-08-18 PROCEDURE — 88305 TISSUE EXAM BY PATHOLOGIST: CPT

## 2022-08-18 PROCEDURE — 6370000000 HC RX 637 (ALT 250 FOR IP): Performed by: STUDENT IN AN ORGANIZED HEALTH CARE EDUCATION/TRAINING PROGRAM

## 2022-08-18 PROCEDURE — A9577 INJ MULTIHANCE: HCPCS | Performed by: FAMILY MEDICINE

## 2022-08-18 RX ORDER — ONDANSETRON 4 MG/1
4 TABLET, ORALLY DISINTEGRATING ORAL 3 TIMES DAILY PRN
Qty: 21 TABLET | Refills: 0 | Status: SHIPPED | OUTPATIENT
Start: 2022-08-18

## 2022-08-18 RX ORDER — LORAZEPAM 1 MG/1
2 TABLET ORAL ONCE
Status: COMPLETED | OUTPATIENT
Start: 2022-08-18 | End: 2022-08-18

## 2022-08-18 RX ADMIN — GADOBENATE DIMEGLUMINE 12 ML: 529 INJECTION, SOLUTION INTRAVENOUS at 15:24

## 2022-08-18 RX ADMIN — SERTRALINE 100 MG: 50 TABLET, FILM COATED ORAL at 08:27

## 2022-08-18 RX ADMIN — CARVEDILOL 3.12 MG: 6.25 TABLET, FILM COATED ORAL at 16:34

## 2022-08-18 RX ADMIN — PANTOPRAZOLE SODIUM 40 MG: 40 TABLET, DELAYED RELEASE ORAL at 07:06

## 2022-08-18 RX ADMIN — CARVEDILOL 3.12 MG: 6.25 TABLET, FILM COATED ORAL at 08:29

## 2022-08-18 RX ADMIN — ASPIRIN 81 MG: 81 TABLET, COATED ORAL at 08:28

## 2022-08-18 RX ADMIN — SODIUM CHLORIDE, PRESERVATIVE FREE 10 ML: 5 INJECTION INTRAVENOUS at 08:38

## 2022-08-18 RX ADMIN — PANTOPRAZOLE SODIUM 40 MG: 40 TABLET, DELAYED RELEASE ORAL at 16:34

## 2022-08-18 RX ADMIN — LORAZEPAM 2 MG: 1 TABLET ORAL at 14:52

## 2022-08-18 ASSESSMENT — PAIN SCALES - GENERAL: PAINLEVEL_OUTOF10: 0

## 2022-08-18 NOTE — ACP (ADVANCE CARE PLANNING)
Advance Care Planning     Advance Care Planning Activator (Inpatient)  Conversation Note      Date of ACP Conversation: 8/18/2022     Conversation Conducted with: Patient with Decision Making Capacity    ACP Activator: discussion had between physician and patient. Health Care Decision Maker:     Current Designated Health Care Decision Maker:     Primary Decision Maker: Tequila Pollack - [facility] - [phone]    Care Preferences    Ventilation: \"If you were in your present state of health and suddenly became very ill and were unable to breathe on your own, what would your preference be about the use of a ventilator (breathing machine) if it were available to you? \"      Would the patient desire the use of ventilator (breathing machine)?: no    \"If your health worsens and it becomes clear that your chance of recovery is unlikely, what would your preference be about the use of a ventilator (breathing machine) if it were available to you? \"     Would the patient desire the use of ventilator (breathing machine)?: No      Resuscitation  \"CPR works best to restart the heart when there is a sudden event, like a heart attack, in someone who is otherwise healthy. Unfortunately, CPR does not typically restart the heart for people who have serious health conditions or who are very sick. \"    \"In the event your heart stopped as a result of an underlying serious health condition, would you want attempts to be made to restart your heart (answer \"yes\" for attempt to resuscitate) or would you prefer a natural death (answer \"no\" for do not attempt to resuscitate)? \" no       [] Yes   [x] No   Educated Patient / Elfredia Latin regarding differences between Advance Directives and portable DNR orders.     Length of ACP Conversation in minutes:      Conversation Outcomes:  [x] ACP discussion completed - Discussion had between MD & patient.  [] Existing advance directive reviewed with patient; no changes to patient's previously recorded wishes  [] New Advance Directive completed  [] Portable Do Not Rescitate prepared for Provider review and signature  [] POLST/POST/MOLST/MOST prepared for Provider review and signature      Follow-up plan:    [] Schedule follow-up conversation to continue planning  [] Referred individual to Provider for additional questions/concerns   [] Advised patient/agent/surrogate to review completed ACP document and update if needed with changes in condition, patient preferences or care setting    [x] This note routed to one or more involved healthcare providers  Electronically signed by Hua Ko RN Case Management [phone] on 8/18/2022 at 2:41 PM

## 2022-08-18 NOTE — CARE COORDINATION
[facility]    Referral received from  to follow for home care services. [facility] unable to staff timely, will require alternate agency, no preference, CM aware. Referral accepted by Remington Ahumada at [facility], will pull from [facility].      Gen Schofield RN, BSN CTN  [facility] [phone]

## 2022-08-18 NOTE — BRIEF OP NOTE
Brief Postoperative Note    Doug Shell  YOB: 1940  [id number]    Pre-operative Diagnosis: right axillary lymphadenopathy    Post-operative Diagnosis: Same    Procedure: US guided biopsy    Anesthesia: Local and Moderate Sedation    Surgeons/Assistants: Randy Steve MD    Estimated Blood Loss: less than 5    Complications: None    Specimens: Was Obtained: 4    Findings: successful US guided biopsy of right axillary lymphadenopathy.     Electronically signed by Randy Steve MD on 8/18/2022 at 2:30 PM

## 2022-08-18 NOTE — CONSULTS
[address]                                  CONSULTATION    PATIENT NAME: Glenis Edwards                      :        1940  MED REC NO:   [id number]                          ROOM:       [id number]  ACCOUNT NO:   [de-identified]                           ADMIT DATE: 2022  PROVIDER:     Massimo Livingston MD    CONSULT DATE:  2022    ONCOLOGY CONSULTATION    CONSULTATION PROVIDER:  Carmen Moss MD    REASON FOR CONSULTATION:  Presumed metastatic lung cancer. HISTORY OF PRESENT ILLNESS:  The patient is a pleasant 59-year-old  female with history of COPD and coronary artery disease who presented to  the hospital with a few week history of progressive fatigue and  shortness of breath. She had a CT pulmonary embolus study that showed a  large right hilar mass with extensive mediastinal lymphadenopathy. The  mass appears to invade the left atrium with tumor thrombus seen. There  were post-obstructive changes in the right upper lobe consistent with  pneumonia. There were multiple bilateral pulmonary nodules. She is in  the process of being worked up for lung cancer prior to her admission. She had a PET CT recently that showed extensive mediastinal  lymphadenopathy as well as a right lower lobe pulmonary nodule and a  right upper lobe hypermetabolic tumor as well as multiple subcutaneous  nodules over her chest wall as well as rib metastasis. There was also  an adrenal nodule, which was hypermetabolic. She is having an  ultrasound-guided biopsy of a right axillary subcutaneous nodule today  as well. She is feeling better after being treated for pneumonia. PAST MEDICAL HISTORY:  1. Coronary artery disease. 2.  Hyperlipidemia. 3.  PTSD. 4.  Depression. PAST SURGICAL HISTORY:  D and C. FAMILY HISTORY:  Noncontributory. SOCIAL HISTORY:  She is .   She smokes one pack per day and has  done so far for 60 years. She is a former heavy drinker, but does not  drink currently. She is retired. MEDICATIONS:  Aspirin 81 mg p.o. daily, Coreg 3.125 mg p.o. b.i.d.,  Ativan as needed, Protonix 40 mg p.o. b.i.d., Zoloft 100 mg p.o. daily. ALLERGIES:  She is allergic to LIPITOR, ZOCOR, and WELLBUTRIN, which  caused unknown reactions. REVIEW OF SYSTEMS:  She denies any recent fever, chills, sweats, nausea,  vomiting, abdominal pain, chest pain, headaches, any new bone aches,  dysphagia, odynophagia, diarrhea, constipation, hemoptysis, hematemesis,  change in vision/hearing/smell/taste, neuropathy, skin rashes,  productive cough, urinary or bowel prolapse or incontinence, petechiae,  purpura, skin rashes, vaginal bleeding, pruritus, hallucinations, nasal  congestion or drainage, seizures, strokes, syncope, depression, anxiety,  suicidal ideations, melena, or hematochezia. She has mild-to-moderate  fatigue. She has mild-to-moderate dyspnea on exertion. Her 10-system  review of systems is otherwise negative. PHYSICAL EXAMINATION:  GENERAL:  She is in no acute distress. VITAL SIGNS:  She is afebrile with normal vital signs. HEENT:  Her pupils are round and reactive to light and accommodation. Extraocular muscles are intact. NECK:  She has no jugular venous distention. No thyromegaly. Oropharynx is clear. She has no carotid bruits. She has no palpable  lymphadenopathy. CHEST:  Lungs are clear to auscultation bilaterally. CARDIOVASCULAR:  Heart is regular rate and rhythm. ABDOMEN:  Nondistended, nontender with bowel sounds x4. No  hepatosplenomegaly. EXTREMITIES:  She has no peripheral clubbing, cyanosis, or edema. NEUROLOGIC:  Nonfocal.    ASSESSMENT AND PLAN:  Presumed metastatic lung cancer. I had a very  long discussion with the patient. I will repeat her staging studies in  great detail.   I explained that if this is metastatic lung cancer, her  disease is not potentially curable, but is treatable; whether it is  small cell or nonsmall cell lung cancer, one of the standards of care is  combined chemotherapy and immunotherapy followed by immunotherapy  maintenance. I explained the potential side effects of chemotherapy  including nausea, vomiting, hearing loss, hair loss, renal failure,  life-threatening infections, neuropathy, and even death. I explained  the potential side effects of immunotherapy including autoimmune  pneumonitis, colitis, encephalitis, etc., - all of which are potentially  life-threatening, but is usually reversible with steroids. I will get a  brain MRI to complete the staging. I will start therapy as an  outpatient. I will await her biopsy results. Thank you for the consultation. I will follow closely.         Van Herring MD    D: 08/18/2022 15:04:43       T: 08/18/2022 15:43:43     [email or web address]  Job#: [id number]     Doc#: [id number]    CC:  Smita Randhawa MD

## 2022-08-18 NOTE — PROGRESS NOTES
Has been awake often, going to the bathroom often. Has been confused and unsteady as the night has progressed. Is slightly less confused this morning when awakened for medication. She is aware of being npo and having the procedure.

## 2022-08-18 NOTE — PLAN OF CARE
Problem: Discharge Planning  Goal: Discharge to home or other facility with appropriate resources  Outcome: Progressing     Problem: Pain  Goal: Verbalizes/displays adequate comfort level or baseline comfort level  Outcome: Progressing     Problem: Skin/Tissue Integrity  Goal: Absence of new skin breakdown  Description: 1. Monitor for areas of redness and/or skin breakdown  2. Assess vascular access sites hourly  3. Every 4-6 hours minimum:  Change oxygen saturation probe site  4. Every 4-6 hours:  If on nasal continuous positive airway pressure, respiratory therapy assess nares and determine need for appliance change or resting period.   Outcome: Progressing     Problem: ABCDS Injury Assessment  Goal: Absence of physical injury  Outcome: Progressing  Flowsheets  Taken 8/18/2022 1416 by Cole Sung RN  Absence of Physical Injury: Implement safety measures based on patient assessment    Problem: Safety - Adult  Goal: Free from fall injury  Outcome: Progressing  Flowsheets (Taken 8/18/2022 1416 by Cole Sung  Problem: Chronic Conditions and Co-morbidities  Goal: Patient's chronic conditions and co-morbidity symptoms are monitored and maintained or improved  Outcome: Progressing   , RN)  Free From Fall Injury: Instruct family/caregiver on patient safety

## 2022-08-18 NOTE — DISCHARGE INSTR - COC
Continuity of Care Form    Patient Name: Stuart Miranda   :  1940  MRN:  [id number]    Admit date:  2022  Discharge date:  2022    Code Status Order: DNR-CCA   Advance Directives:     Admitting Physician:  Michael Syed MD  PCP: Tequila Davis MD    Discharging Nurse: Joni Orourke 23 Unit/Room#: K1L-4985/7088-36  Discharging Unit Phone Number: [phone]    Emergency Contact:   Extended Emergency Contact Information  Primary Emergency Contact: Talia Burgess  Address: [facility]  Mobile Phone: [phone]  Relation: Other    Past Surgical History:  Past Surgical History:   Procedure Laterality Date    DILATATION, ESOPHAGUS      colon \"fissure\"       Immunization History:   Immunization History   Administered Date(s) Administered    COVID-19, PFIZER PURPLE top, DILUTE for use, (age 15 y+), 30mcg/0.3mL 2021, 2021, 2022    Influenza, FLUAD, (age 72 y+), Adjuvanted 10/06/2021    Influenza, High Dose (Fluzone 65 yrs and older) 2011, 10/02/2013, 10/09/2014, 11/15/2016, 01/10/2018, 2018    Influenza, Triv, inactivated, subunit, adjuvanted, IM (Fluad 65 yrs and older) 2019    Pneumococcal Conjugate 13-valent (Qufvtng37) 2016    Pneumococcal Polysaccharide (Dxepneinb08) 2009    Td, unspecified formulation 2010       Active Problems:  Patient Active Problem List   Diagnosis Code    Hyperlipidemia with target LDL less than 100 E78.5    Tobacco abuse Z72.0    Vertigo, mild R42    Posttraumatic stress disorder F43.10    GERD (gastroesophageal reflux disease) K21.9    Atypical chest pain R07.89    Sialadenitis- recurrent K11.20    Needs flu shot Z23    Menopausal state N95.1    Major depressive disorder with single episode, in full remission ([facility] F32.5    Pulmonary nodule 8mm, neg PET 2016, repeat CT per pulmonary R91.1    Chronic obstructive pulmonary disease ([facility] J44.9    History of MI (myocardial infarction) I25.2    Hiatal hernia K44.9    Non-rheumatic mitral valve stenosis- mild-mod I34.2    Chronic systolic congestive heart failure ([facility]- EF 30-35% 12/2016 I50.22    Coronary artery disease involving native coronary artery without angina pectoris- cath 12/16 I25.10    Essential hypertension I10    Stage 3b chronic kidney disease (HCC) N18.32    Cough R05.9    Mass of right axilla- need biopsy R22.31    Hilar adenopathy- needs onc consult R59.0    Left atrial thrombus I51.3    Shortness of breath R06.02    Fatigue R53.83    Weight loss, unintentional R63.4    Dysphagia R13.10    Lung cancer (HCC) C34.90    Mass of right lung R91.8    Left atrial mass I51.89       Isolation/Infection:   Isolation            No Isolation          Patient Infection Status       None to display            Nurse Assessment:  Last Vital Signs: /74   Pulse 83   Temp 98.3 °F (36.8 °C) (Oral)   Resp 18   Ht 5' 2\" (1.575 m)   Wt 136 lb 0.4 oz (61.7 kg)   SpO2 92%   BMI 24.88 kg/m²     Last documented pain score (0-10 scale): Pain Level: 0  Last Weight:   Wt Readings from Last 1 Encounters:   08/18/22 136 lb 0.4 oz (61.7 kg)     Mental Status:  oriented, alert, and disoriented at times    IV Access:  - None    Nursing Mobility/ADLs:  Walking   Independent  Transfer  Independent  Bathing  Independent  Dressing  Independent  Toileting  Independent  Feeding  [address]  Independent  Med Delivery   whole    Wound Care Documentation and Therapy:        Elimination:  Continence: Bowel: Yes  Bladder: Yes  Urinary Catheter: None   Colostomy/Ileostomy/Ileal Conduit: No       Date of Last BM: 08/18/2022    Intake/Output Summary (Last 24 hours) at 8/18/2022 1357  Last data filed at 8/17/2022 2300  Gross per 24 hour   Intake 840 ml   Output --   Net 840 ml     I/O last 3 completed shifts:   In: 1080 [P.O.:1080]  Out: -     Safety Concerns:     Sundowners [place] and At Risk for Falls    Impairments/Disabilities: on 8/18/22 at 1:58 PM EDT

## 2022-08-18 NOTE — CARE COORDINATION
INITIAL CASE MANAGEMENT ASSESSMENT    Reviewed chart, patient off unit, spoke to Jazmin Maya patient's POA to assess possible discharge needs. Explained Case Management role/services. Living Situation: Confirmed address, lives alone in a 2 level house. ADLs: Independent     DME: Doesn't use but has a walker & cane    PT/OT Recs: Await eval & recs. MD ordered therapy in case home care was needed. Explained home care could be ordered without PT/OT eval.     Active Services: None     Transportation: Patient doesn't drive, neighbor Jazmin Maya transports PRN     Medications: Uses [facility] on [address] [facility] -- no issues obtaining/affording medications    PCP: Keron Rodegrs MD      HD/PD: n/a    PLAN/COMMENTS: Patient will return home. Bailey Goss would like her to have home care. Home care options provided, no preference, agree to referral to [address]. Referral made. Possible discharge today per my discussion with MD. Jazmin Maya will transport home. The Plan for Transition of Care is related to the following treatment goals: strengthening, home care    The patient's representative Jazmin Maya was provided with a choice of provider and agrees   with the discharge plan. [x] Yes [] No    Freedom of choice list was provided with basic dialogue that supports the patient's individualized plan of care/goals, treatment preferences and shares the quality data associated with the providers. [x] Yes [] No    SW/CM provided contact information for patient or family to call with any questions. SW/CM will follow and assist as needed.     Electronically signed by Cresenciano Dubin, RN Case Management [phone] on 8/18/2022 at 2:39 PM

## 2022-08-18 NOTE — PROGRESS NOTES
CVTS    LA mass -  Agree with assessment by cardiology. Bx scheduled for today. Hopefully d/c home later today, could have outpt oncology f/u once path result available.     Josefa Crews MD  8/18/2022  9:44 AM

## 2022-08-18 NOTE — CONSULTS
Oncology Consult    See dictation    A/P:  Presumed metastatic lung cancer. She is having a biopsy of one of her subcutaneous nodules today. I will get a brain MRI to complete the staging. She understands that if this is metastatic lung cancer, her disease is not curable but is treatable. One of the standards of care for metastatic lung cancer (whether it is small cell or non-small cell) is combined chemotherapy plus immunotherapy. I will start this as an outpatient. Thank you for the consultation. I will follow closely.     Janice Garcia MD

## 2022-08-18 NOTE — PROGRESS NOTES
Occupational Therapy    OT order received. Pt off floor for MRI. Will follow up as schedule and pt condition permit.       Electronically signed by Allyn Lane OT on 8/18/2022 at 2:43 PM

## 2022-08-19 ENCOUNTER — APPOINTMENT (OUTPATIENT)
Dept: CT IMAGING | Age: 82
DRG: 988 | End: 2022-08-19
Payer: MEDICARE

## 2022-08-19 ENCOUNTER — TELEPHONE (OUTPATIENT)
Dept: FAMILY MEDICINE CLINIC | Age: 82
End: 2022-08-19

## 2022-08-19 VITALS
HEIGHT: 62 IN | DIASTOLIC BLOOD PRESSURE: 54 MMHG | WEIGHT: 134.26 LBS | TEMPERATURE: 98.4 F | OXYGEN SATURATION: 91 % | HEART RATE: 85 BPM | BODY MASS INDEX: 24.71 KG/M2 | SYSTOLIC BLOOD PRESSURE: 105 MMHG | RESPIRATION RATE: 21 BRPM

## 2022-08-19 LAB
ANION GAP SERPL CALCULATED.3IONS-SCNC: 13 MMOL/L (ref 3–16)
BASOPHILS ABSOLUTE: 0 K/UL (ref 0–0.2)
BASOPHILS RELATIVE PERCENT: 0.4 %
BUN BLDV-MCNC: 13 MG/DL (ref 7–20)
CALCIUM SERPL-MCNC: 8.8 MG/DL (ref 8.3–10.6)
CHLORIDE BLD-SCNC: 101 MMOL/L (ref 99–110)
CO2: 21 MMOL/L (ref 21–32)
CREAT SERPL-MCNC: 1 MG/DL (ref 0.6–1.2)
EOSINOPHILS ABSOLUTE: 0.2 K/UL (ref 0–0.6)
EOSINOPHILS RELATIVE PERCENT: 2.1 %
GFR AFRICAN AMERICAN: >60
GFR NON-AFRICAN AMERICAN: 53
GLUCOSE BLD-MCNC: 76 MG/DL (ref 70–99)
HCT VFR BLD CALC: 39.2 % (ref 36–48)
HEMOGLOBIN: 13.3 G/DL (ref 12–16)
LYMPHOCYTES ABSOLUTE: 0.8 K/UL (ref 1–5.1)
LYMPHOCYTES RELATIVE PERCENT: 11.1 %
MCH RBC QN AUTO: 33.5 PG (ref 26–34)
MCHC RBC AUTO-ENTMCNC: 33.9 G/DL (ref 31–36)
MCV RBC AUTO: 98.7 FL (ref 80–100)
MONOCYTES ABSOLUTE: 0.8 K/UL (ref 0–1.3)
MONOCYTES RELATIVE PERCENT: 10.5 %
NEUTROPHILS ABSOLUTE: 5.6 K/UL (ref 1.7–7.7)
NEUTROPHILS RELATIVE PERCENT: 75.9 %
PDW BLD-RTO: 13.9 % (ref 12.4–15.4)
PLATELET # BLD: 181 K/UL (ref 135–450)
PMV BLD AUTO: 7.5 FL (ref 5–10.5)
POTASSIUM REFLEX MAGNESIUM: 4.4 MMOL/L (ref 3.5–5.1)
RBC # BLD: 3.98 M/UL (ref 4–5.2)
SODIUM BLD-SCNC: 135 MMOL/L (ref 136–145)
WBC # BLD: 7.3 K/UL (ref 4–11)

## 2022-08-19 PROCEDURE — 70496 CT ANGIOGRAPHY HEAD: CPT

## 2022-08-19 PROCEDURE — 94760 N-INVAS EAR/PLS OXIMETRY 1: CPT

## 2022-08-19 PROCEDURE — 6370000000 HC RX 637 (ALT 250 FOR IP): Performed by: INTERNAL MEDICINE

## 2022-08-19 PROCEDURE — 99231 SBSQ HOSP IP/OBS SF/LOW 25: CPT | Performed by: INTERNAL MEDICINE

## 2022-08-19 PROCEDURE — 6360000004 HC RX CONTRAST MEDICATION: Performed by: STUDENT IN AN ORGANIZED HEALTH CARE EDUCATION/TRAINING PROGRAM

## 2022-08-19 PROCEDURE — 85025 COMPLETE CBC W/AUTO DIFF WBC: CPT

## 2022-08-19 PROCEDURE — 36415 COLL VENOUS BLD VENIPUNCTURE: CPT

## 2022-08-19 PROCEDURE — 2580000003 HC RX 258: Performed by: INTERNAL MEDICINE

## 2022-08-19 PROCEDURE — 70450 CT HEAD/BRAIN W/O DYE: CPT

## 2022-08-19 PROCEDURE — 80048 BASIC METABOLIC PNL TOTAL CA: CPT

## 2022-08-19 RX ADMIN — SODIUM CHLORIDE, PRESERVATIVE FREE 10 ML: 5 INJECTION INTRAVENOUS at 08:12

## 2022-08-19 RX ADMIN — CARVEDILOL 3.12 MG: 6.25 TABLET, FILM COATED ORAL at 08:11

## 2022-08-19 RX ADMIN — ASPIRIN 81 MG: 81 TABLET, COATED ORAL at 08:11

## 2022-08-19 RX ADMIN — IOPAMIDOL 75 ML: 755 INJECTION, SOLUTION INTRAVENOUS at 09:08

## 2022-08-19 RX ADMIN — PANTOPRAZOLE SODIUM 40 MG: 40 TABLET, DELAYED RELEASE ORAL at 05:01

## 2022-08-19 RX ADMIN — SERTRALINE 100 MG: 50 TABLET, FILM COATED ORAL at 08:11

## 2022-08-19 ASSESSMENT — PAIN SCALES - GENERAL
PAINLEVEL_OUTOF10: 0
PAINLEVEL_OUTOF10: 0

## 2022-08-19 NOTE — PROGRESS NOTES
Hematology Oncology Daily Progress Note    Admit Date: 8/16/2022  Hospital day several    Subjective:     Patient has complaints of stable weakness and HAINES--denies SOB/CP. Medication side effects: none    Scheduled Meds:   pantoprazole  40 mg Oral BID AC    sertraline  100 mg Oral Daily    carvedilol  3.125 mg Oral BID WC    aspirin EC  81 mg Oral Daily    sodium chloride flush  10 mL IntraVENous 2 times per day     Continuous Infusions:   sodium chloride       PRN Meds:ipratropium-albuterol, sodium chloride flush, sodium chloride, ondansetron, polyethylene glycol, acetaminophen **OR** acetaminophen    Review of Systems  Pertinent items are noted in HPI. REVIEW OF SYSTEMS:         Constitutional: Denies fever, sweats, weight loss     Eyes: No visual changes or diplopia. No scleral icterus. ENT: No Headaches, hearing loss or vertigo. No mouth sores or sore throat. Cardiovascular: No chest pain, dyspnea on exertion, palpitations or loss of consciousness. Respiratory: No cough or wheezing, no sputum production. No hemoptysis. .    Gastrointestinal: No abdominal pain, appetite loss, blood in stools. No change in bowel habits. Genitourinary: No dysuria, trouble voiding, or hematuria. Musculoskeletal:  Generalized weakness. No joint complaints. Integumentary: No rash or pruritis. Neurological: No headache, diplopia. No change in gait, balance, or coordination. No paresthesias. Endocrine: No temperature intolerance. No excessive thirst, fluid intake, or urination. Hematologic/Lymphatic: No abnormal bruising or ecchymoses, blood clots or swollen lymph nodes. Allergic/Immunologic: No nasal congestion or hives.        Objective:     Patient Vitals for the past 8 hrs:   BP Temp Temp src Pulse Resp SpO2 Weight   08/19/22 0730 120/68 97.7 °F (36.5 °C) Oral 78 20 94 % --   08/19/22 0445 (!) 145/53 98.4 °F (36.9 °C) Oral 94 24 96 % 134 lb 4.2 oz (60.9 kg)   08/19/22 0031 (!) 124/56 98.4 °F (36.9 °C) Oral 74 25 96 % -- I/O last 3 completed shifts: In: 600 [P.O.:600]  Out: 1 [Urine:1]  No intake/output data recorded. /68   Pulse 78   Temp 97.7 °F (36.5 °C) (Oral)   Resp 20   Ht 5' 2\" (1.575 m)   Wt 134 lb 4.2 oz (60.9 kg)   SpO2 94%   BMI 24.56 kg/m²     General Appearance:    Alert, cooperative, no distress, appears stated age   Head:    Normocephalic, without obvious abnormality, atraumatic   Eyes:    PERRL, conjunctiva/corneas clear, EOM's intact, fundi     benign, both eyes        Ears:    Normal TM's and external ear canals, both ears   Nose:   Nares normal, septum midline, mucosa normal, no drainage    or sinus tenderness   Throat:   Lips, mucosa, and tongue normal; teeth and gums normal   Neck:   Supple, symmetrical, trachea midline, no adenopathy;        thyroid:  No enlargement/tenderness/nodules; no carotid    bruit or JVD   Back:     Symmetric, no curvature, ROM normal, no CVA tenderness   Lungs:     Clear to auscultation bilaterally, respirations unlabored   Chest wall:    No tenderness or deformity   Heart:    Regular rate and rhythm, S1 and S2 normal, no murmur, rub   or gallop   Abdomen:     Soft, non-tender, bowel sounds active all four quadrants,     no masses, no organomegaly           Extremities:   Extremities normal, atraumatic, no cyanosis or edema   Pulses:   2+ and symmetric all extremities   Skin:   Skin color, texture, turgor normal, no rashes or lesions   Lymph nodes:   Cervical, supraclavicular, and axillary nodes normal   Neurologic:   CNII-XII intact.  Normal strength, sensation and reflexes       throughout       EData ReviewCBC:   Lab Results   Component Value Date/Time    WBC 7.3 08/19/2022 04:39 AM    RBC 3.98 08/19/2022 04:39 AM       Assessment:     Principal Problem:    Left atrial thrombus  Active Problems:    Hyperlipidemia with target LDL less than 100    Stage 3b chronic kidney disease (HCC)    Shortness of breath    Fatigue    Weight loss, unintentional    Dysphagia Lung cancer [facility])    Mass of right lung    Left atrial mass    Atypical chest pain    Chronic obstructive pulmonary disease (HCC)    Chronic systolic congestive heart failure (HCC)- EF 30-35% 12/2016    Coronary artery disease involving native coronary artery without angina pectoris- cath 12/16    Essential hypertension  Resolved Problems:    * No resolved hospital problems. *      Plan:     1. Probable metastatic lung cancer. Her biopsy is pending. Her brain MRI showed no cancer but did show a possible aneurysm. A CTA is being done. Follow-up with me early next week.         Electronically signed by Sarahy Casarez MD on 8/19/2022 at 7:44 AM

## 2022-08-19 NOTE — PROGRESS NOTES
Physician Progress Note      PATIENT:               Tony Tejeda  CSN #:                  [id number]  :                       1940  ADMIT DATE:       2022 2:58 PM  DISCH DATE:  RESPONDING  PROVIDER #:        Jessica Holt          QUERY TEXT:    Pt admitted with Left atrial thrombus. Pt noted to have low serum sodium. If   possible, please document in the progress notes and discharge summary if you   are evaluating and / or treating any of the following: The medical record reflects the following:  Risk Factors: Current admission for Left atrial thrombus and suspected lung   cancer. Clinical Indicators: Na 134, 133, 132, 135  Treatment: Monitor BMP    Thank You,  Pollyann Kanner RN BSN CDS CRCR  [email or web address]  Options provided:  -- Hyponatremia  -- Pseudohyponatremia  -- Clinically insignificant low serum sodium  -- Other - I will add my own diagnosis  -- Disagree - Not applicable / Not valid  -- Disagree - Clinically unable to determine / Unknown  -- Refer to Clinical Documentation Reviewer    PROVIDER RESPONSE TEXT:    This patient has hyponatremia.     Query created by: Kandi Gómez on 2022 8:24 AM      Electronically signed by:  Jessica Holt 2022 1:31 PM

## 2022-08-19 NOTE — PROGRESS NOTES
[Bupropion] Nausea Only         Home Meds:   Prior to Admission medications    Medication Sig Start Date End Date Taking? Authorizing Provider   ondansetron (ZOFRAN-ODT) 4 MG disintegrating tablet Take 1 tablet by mouth 3 times daily as needed for Nausea or Vomiting 8/18/22  Yes Jose Michael MD   famotidine (PEPCID) 20 MG tablet Take 20 mg by mouth in the morning. Yes Historical Provider, MD   sertraline (ZOLOFT) 100 MG tablet TAKE ONE TABLET BY MOUTH DAILY. 8/8/22   Harsh Love MD   carvedilol (COREG) 3.125 MG tablet TAKE ONE TABLET BY MOUTH TWICE A DAY WITH MEALS 5/31/22   Harsh Love MD   aspirin EC 81 MG EC tablet Take 81 mg by mouth daily. Historical Provider, MD   acetaminophen (TYLENOL) 650 MG extended release tablet Take 650 mg by mouth every 8 hours as needed. Historical Provider, MD       Family History:       Problem Relation Age of Onset    Ulcerative Colitis Brother          Social History:   TOBACCO:   reports that she has quit smoking. Her smoking use included cigarettes. She has a 50.00 pack-year smoking history. She has quit using smokeless tobacco.  ETOH:   reports no history of alcohol use. DRUGS:  reports no history of drug use.           REVIEW OF SYSTEMS:  Review of Systems -   General ROS: wt loss  Psychological ROS: negative  Ophthalmic ROS: negative  ENT ROS: negative  Allergy and Immunology ROS: negative  Hematological and Lymphatic ROS: negative  Endocrine ROS: negative  Breast ROS: negative  Respiratory ROS: sob  Cardiovascular ROS: no chest pain or dyspnea on exertion  Gastrointestinal ROS:negative  Genito-Urinary ROS: negative  Musculoskeletal ROS: negative  Neurological ROS: negative  Dermatological ROS: negative        Physical Exam:    Vitals: /68   Pulse 93   Temp 97.7 °F (36.5 °C) (Oral)   Resp 20   Ht 5' 2\" (1.575 m)   Wt 134 lb 4.2 oz (60.9 kg)   SpO2 94%   BMI 24.56 kg/m²     Last Body weight:   Wt Readings from Last 3 Encounters:   08/19/22 134 lb 4.2 oz (60.9 kg)   07/18/22 143 lb 9.6 oz (65.1 kg)   06/15/22 150 lb (68 kg)       Body Mass Index : Body mass index is 24.56 kg/m². Intake and Output summary:   Intake/Output Summary (Last 24 hours) at 8/19/2022 1019  Last data filed at 8/19/2022 0505  Gross per 24 hour   Intake 240 ml   Output 1 ml   Net 239 ml       Physical Examination:     PHYSICAL EXAM:    Gen:  chronically ill appearing   Eyes: PERRL. Anicteric sclera. No conjunctival injection. ENT: No discharge. Posterior oropharynx clear. External appearance of ears and nose normal.  Neck: Trachea midline. No mass, no lymphadenopathy    Resp:  diminished with no active wheezing rales or rhonchi   CV: Regular rate. Regular rhythm. No murmur or rub. No edema. GI: Soft, Non-tender. Non-distended. +BS  Skin: Warm, dry, w/o erythema. Lymph: No cervical or supraclavicular LAD. M/S: No cyanosis. No clubbing. Neuro:  CN 2-12 tested, no focal neurologic deficit. Moves all extremities  Psych: Awake and alert, Oriented x 3. Judgement and insight appropriate. Mood stable. Laboratory findings:-    CBC:   Recent Labs     08/19/22  0439   WBC 7.3   HGB 13.3          BMP:    Recent Labs     08/17/22  1052 08/18/22  0410 08/19/22  0439   * 132* 135*   K 4.3 4.1 4.4   CL 98* 100 101   CO2 22 22 21   BUN 13 12 13   CREATININE 0.9 1.0 1.0   GLUCOSE 94 91 76       S. Calcium:  Recent Labs     08/19/22  0439   CALCIUM 8.8       Hepatic functions:   Recent Labs     08/16/22  1549   ALKPHOS 60   ALT 14   AST 20   PROT 6.7   BILITOT 0.4   BILIDIR <0.2   LABALBU 3.6       Cardiac enzymes:  Recent Labs     08/16/22  1549   TROPONINI <0.01       Thyroid functions:   Lab Results   Component Value Date/Time    TSH 1.59 05/31/2022 03:22 PM            Radiology Review:  Pertinent images / reports were reviewed as a part of this visit.     CT Chest w/ contrast: Results for orders placed during the hospital encounter of 12/18/16    CT Chest W Contrast    Narrative  EXAMINATION:  CT OF THE CHEST WITH CONTRAST 12/18/2016 6:29 am    TECHNIQUE:  CT of the chest was performed with the administration of intravenous  contrast. Multiplanar reformatted images are provided for review. Dose  modulation, iterative reconstruction, and/or weight based adjustment of the  mA/kV was utilized to reduce the radiation dose to as low as reasonably  achievable. COMPARISON:  None. HISTORY:  ORDERING SYSTEM PROVIDED HISTORY: abnormal CXR  TECHNOLOGIST PROVIDED HISTORY:  Ordering Physician Provided Reason for Exam: f/u abnorm CXR  Acuity: Unknown  Type of Exam: Subsequent/Follow-up    FINDINGS:  Mediastinum: There are calcified lymph nodes within the mediastinum and left  hilum. The heart size is normal.  There is a moderate hiatal hernia. Lungs/pleura: There is a trace left pleural effusion and a mild right pleural  effusion. There are mild interstitial and ground-glass alveolar infiltrates  within both lung bases, right greater than left. The nodular density seen on chest x-ray corresponds to a mass within the  right upper lobe measuring 2 x 2 cm in diameter. The mass is associated with  a pair of small calcifications, best seen on image number 40 of series 2. Upper Abdomen: There are calcified granulomas in the spleen. Vascular  calcifications are noted. Soft Tissues/Bones: There are degenerative changes of the spine. Impression  Mass seen on chest x-ray corresponds to a lesion within the right upper lobe. This is of uncertain nature but could reflect an evolving granuloma given  associated calcification and evidence of chronic granulomatous disease within  the chest and upper abdomen. Malignancy still within the differential  diagnosis. Mild bibasilar infiltrates and pleural effusions. Moderate hiatal hernia.     RECOMMENDATIONS:  Fleischner Society guidelines for follow-up and management of pulmonary  nodules:    Nodule size greater than 8 mm    In low-risk and high-risk patients follow-up CT at around 3, 9, and 24  months, dynamic contrast-enhanced CT, PET, and/or biopsy. Low risk patients include individuals with minimal or absent history of  smoking and other known risk factors. High risk patients include individuals with a history of smoking or other  known risk factors. Radiology 2005; 126:158-435      CT Chest w/o contrast: Results for orders placed during the hospital encounter of 08/08/17    CT Chest WO Contrast    Narrative  EXAMINATION:  CT OF THE CHEST WITHOUT CONTRAST 8/8/2017 12:05 pm    TECHNIQUE:  CT of the chest was performed without the administration of intravenous  contrast. Multiplanar reformatted images are provided for review. Dose  modulation, iterative reconstruction, and/or weight based adjustment of the  mA/kV was utilized to reduce the radiation dose to as low as reasonably  achievable. COMPARISON:  PET-CT 12/28/2016. Chest CT 12/18/2016    HISTORY:  ORDERING PHYSICIAN PROVIDED HISTORY: Pulmonary nodule  TECHNOLOGIST PROVIDED HISTORY:  Technologist Provided Reason for Exam: follow up nodule, hx pna  Acuity: Acute  Type of Encounter: Subsequent/Follow-up    FINDINGS:  Mediastinum: Thyroid gland appears normal.  Atherosclerotic change is seen  the aorta. Coronary artery calcifications are seen. Small hiatal hernia is  seen. Small mediastinal nodes are noted    Lungs/pleura: Lobular nodule in the right upper lobe with scattered  calcifications is unchanged measuring 2.0 x 2.0 cm. Linear opacities are seen in the apices, compatible with scarring. Tiny noncalcified pulmonary nodule seen in left lower lobe peripherally,  measuring 3 mm, unchanged    Tiny nodule in right middle lobe is unchanged measuring 3 mm    Changes of pulmonary edema seen previously have resolved.     Upper Abdomen: Adrenal glands appear normal.  Scattered colonic diverticula  are seen    Soft Tissues/Bones: Spurring is seen in the spine    Impression  Resolution of pulmonary edema    Stable partially calcified nodule right upper lobe. Tiny nodule in right  middle lobe and left lower lobe are unchanged      CTPA: Results for orders placed during the hospital encounter of 08/16/22    CT CHEST PULMONARY EMBOLISM W CONTRAST    Narrative  EXAMINATION:  CTA OF THE CHEST 8/16/2022 3:55 pm    TECHNIQUE:  CTA of the chest was performed after the administration of intravenous  contrast.  Multiplanar reformatted images are provided for review. MIP  images are provided for review. Automated exposure control, iterative  reconstruction, and/or weight based adjustment of the mA/kV was utilized to  reduce the radiation dose to as low as reasonably achievable. COMPARISON:  July 29, 2022    HISTORY:  ORDERING SYSTEM PROVIDED HISTORY: worsening sob, recent ca diagnosis  TECHNOLOGIST PROVIDED HISTORY:  Reason for exam:->worsening sob, recent ca diagnosis  Decision Support Exception - unselect if not a suspected or confirmed  emergency medical condition->Emergency Medical Condition (MA)  Reason for Exam: worsening sob, recent ca diagnosis, recent PEt scan    FINDINGS:  Pulmonary Arteries: There is no evidence of acute pulmonary embolism. Main  pulmonary artery is not enlarged. Mediastinum: A large right hilar mass with extensive mediastinal extension. The primary mass measures 4.5 x 4.9 cm. High paratracheal nodes measure up  to 2.3 cm. Lower paratracheal nodes measure up to 3.3 cm. Subcarinal nodes  measure up to 4.5 cm. There is encasement right pulmonary arteries as well  as veins. There is invasion into the left atrium and obliteration of the  right upper pulmonary vein. The left pulmonary veins appear intact. A large  filling defect can be seen in the left atrium on the right with the extension  of the hilar mass. This is likely a tumor thrombus. Lungs/pleura: Postobstructive changes seen in the right upper lobe.   A partly  calcified right upper lobe mass measures 1.9 x 1.8 cm. An additional lesion  can be seen in the superior segment of the right lower lobe which measures  1.3 x 1.5 cm. No evidence of pleural effusion or pneumothorax. A small  pleural based lesion in the right lower lobe measures 6.6 mm. Upper Abdomen: Moderate hiatal hernia. A right adrenal lesion measures 1.6  cm should be considered metastatic until proven otherwise. No evidence of  left adrenal lesions. Evidence of osteopenia but no focal destructive  changes. Soft Tissues/Bones: Multiple subcutaneous nodules can be seen. In the right  anterior superior chest a 1 cm nodule is noted. In the left anterior chest a  1 cm nodule is noted. In the left anterolateral chest, a 1.5 cm nodule. In  the right anterolateral chest, a 1.1 cm nodule. The subcutaneous nodules may  represent metastatic disease as well. Impression  1. No evidence of acute pulmonary embolism. 2. Large right hilar mass which encases pulmonary arteries and veins and  obliterates the right upper lobe pulmonary vein. There is invasion into the  left atrium with tumor thrombus seen in the right aspect of the left atrium. Extensive mediastinal lymphadenopathy. 3. Postobstructive changes in the right upper lobe. Multiple pulmonary  nodules with the most prominent in the right upper lobe measuring up to 1.9  cm and the superior segment of the right lower lobe measuring 1.5 cm. 4. Multiple subcutaneous nodules also likely represent metastatic disease. CXR PA/LAT: Results for orders placed during the hospital encounter of 05/31/22    XR CHEST (2 VW)    Narrative  EXAMINATION:  TWO XRAY VIEWS OF THE CHEST    5/31/2022 3:42 pm    COMPARISON:  Chest x-ray dated 12/20/2016.     HISTORY:  ORDERING SYSTEM PROVIDED HISTORY: Chronic obstructive pulmonary disease,  unspecified COPD type ([facility] [address]  TECHNOLOGIST PROVIDED HISTORY:  Reason for Exam: Chronic obstructive pulmonary disease, weight loss    FINDINGS:  Clear lungs. No pleural effusion or pneumothorax. Cardiomediastinal  silhouette is unremarkable. Degenerative disease of the visualized osseous  structures. Impression  Clear lungs. CXR portable: Results for orders placed during the hospital encounter of 08/16/22    XR CHEST PORTABLE    Narrative  EXAMINATION:  ONE XRAY VIEW OF THE CHEST    8/16/2022 3:52 pm    COMPARISON:  CT chest dated 29 July 2022    HISTORY:  ORDERING SYSTEM PROVIDED HISTORY: Shortness of breath  TECHNOLOGIST PROVIDED HISTORY:  Reason for exam:->Shortness of breath  Reason for Exam: sob    FINDINGS:  Stable appearance of a pulmonary nodule in the right upper lobe with  surrounding ground-glass opacities. Stable mediastinal lymphadenopathy. No  pneumothorax or pleural effusion. Impression  1. Similar appearance of mediastinal lymphadenopathy. 2.  Similar appearance of pulmonary nodule in right upper lobe with  surrounding ground-glass attenuation.         CT/PET reviewed by me, agree with findings              Oh Trevino MD, M.D.            8/19/2022, 10:19 AM

## 2022-08-19 NOTE — PLAN OF CARE
Problem: Discharge Planning  Goal: Discharge to home or other facility with appropriate resources  8/19/2022 1048 by Chapincito Manrique RN  Outcome: Progressing  Flowsheets (Taken 8/19/2022 1048)  Discharge to home or other facility with appropriate resources:   Identify barriers to discharge with patient and caregiver   Arrange for needed discharge resources and transportation as appropriate   Identify discharge learning needs (meds, wound care, etc)   Arrange for interpreters to assist at discharge as needed   Refer to discharge planning if patient needs post-hospital services based on physician order or complex needs related to functional status, cognitive ability or social support system     Problem: Pain  Goal: Verbalizes/displays adequate comfort level or baseline comfort level  8/19/2022 1048 by Chapincito Manrique RN  Outcome: Progressing  Flowsheets (Taken 8/19/2022 1048)  Verbalizes/displays adequate comfort level or baseline comfort level:   Encourage patient to monitor pain and request assistance   Assess pain using appropriate pain scale   Administer analgesics based on type and severity of pain and evaluate response   Implement non-pharmacological measures as appropriate and evaluate response   Consider cultural and social influences on pain and pain management   Notify Licensed Independent Practitioner if interventions unsuccessful or patient reports new pain     Problem: Skin/Tissue Integrity  Goal: Absence of new skin breakdown  Description: 1. Monitor for areas of redness and/or skin breakdown  2. Assess vascular access sites hourly  3. Every 4-6 hours minimum:  Change oxygen saturation probe site  4. Every 4-6 hours:  If on nasal continuous positive airway pressure, respiratory therapy assess nares and determine need for appliance change or resting period.   8/19/2022 1048 by Chapincito Manrique RN  Outcome: Progressing     Problem: ABCDS Injury Assessment  Goal: Absence of physical injury  8/19/2022 1048 by Vijaya Chun RN  Outcome: Progressing  Flowsheets  Taken 8/19/2022 1048 by Vijaya Chun RN  Absence of Physical Injury: Implement safety measures based on patient assessment  Taken 8/19/2022 1039 by Vijaya Chun RN  Absence of Physical Injury: Implement safety measures based on patient assessment  Taken 8/19/2022 0035 by Vinicio Avalos RN  Absence of Physical Injury: Implement safety measures based on patient assessment     Problem: Safety - Adult  Goal: Free from fall injury  8/19/2022 1048 by Vijaya Chun RN  Outcome: Thania Melchor (Taken 8/19/2022 0035 by Vinicio Avalos RN)  Free From Fall Injury: Instruct family/caregiver on patient safety     Problem: Chronic Conditions and Co-morbidities  Goal: Patient's chronic conditions and co-morbidity symptoms are monitored and maintained or improved  8/19/2022 1048 by Vijaya Chun RN  Outcome: Progressing  Flowsheets (Taken 8/19/2022 1048)  Care Plan - Patient's Chronic Conditions and Co-Morbidity Symptoms are Monitored and Maintained or Improved:   Monitor and assess patient's chronic conditions and comorbid symptoms for stability, deterioration, or improvement   Collaborate with multidisciplinary team to address chronic and comorbid conditions and prevent exacerbation or deterioration   Update acute care plan with appropriate goals if chronic or comorbid symptoms are exacerbated and prevent overall improvement and discharge

## 2022-08-19 NOTE — CARE COORDINATION
CASE MANAGEMENT DISCHARGE SUMMARY:  Met with patient & neighbors Carla Marcum. Plan to discharge home today with [facility]. Spoke to Grace sales/ [facility], they can accept and pull orders via Epic. RN aware of dc plan. DISCHARGE DATE: 8/19/2022    DISCHARGED TO: Home with home care     HOME CARE AGENCY:   Name: [facility]  Address:   [place] [address].  [place] [address]  Phone:  [phone]  Fax:  [phone]    TRANSPORTATION: Neighbor             TIME: this afternoon     [facility]: [address] [place]             NUMBER: [phone]    Electronically signed by Shraddha Vivas RN Case Management on 8/19/2022 at 2:21 PM

## 2022-08-19 NOTE — DISCHARGE SUMMARY
Hospital Medicine Discharge Summary    Patient ID: Deana Sanders      Patient's PCP: Elijah Dias MD    Admit Date: 8/16/2022     Discharge Date:   08/19/22      Admitting Provider: Tatiaan Sheppard MD     Discharge Provider: Kiersten Allen MD     Discharge Diagnoses: Active Hospital Problems    Diagnosis     Hyperlipidemia with target LDL less than 100 [E78.5]      Priority: High    Shortness of breath [R06.02]      Priority: Medium    Fatigue [R53.83]      Priority: Medium    Weight loss, unintentional [R63.4]      Priority: Medium    Dysphagia [R13.10]      Priority: Medium    Lung cancer ([facility] [C34.90]      Priority: Medium    Mass of right lung [R91.8]      Priority: Medium    Left atrial mass [I51.89]      Priority: Medium    Left atrial thrombus [I51.3]      Priority: Medium    Stage 3b chronic kidney disease ([facility] [N18.32]      Priority: Medium    Essential hypertension [I10]     Coronary artery disease involving native coronary artery without angina pectoris- cath 12/16 [I25.10]     Chronic systolic congestive heart failure ([facility]- EF 30-35% 12/2016 [I50.22]     Chronic obstructive pulmonary disease ([facility] [J44.9]     Atypical chest pain [R07.89]        The patient was seen and examined on day of discharge and this discharge summary is in conjunction with any daily progress note from day of discharge. Hospital Course:      80y.o. year-old female with a history of hypertension, hyperlipidemia, COPD, CAD s/p MI, chronic systolic CHF and CKD3. She was recently diagnosed with lung cancer 2 weeks ago. Patient was found to have a large hilar mass invading pulmonary arteries and veins with tumor thrombus in the right aspect of the left atrium. Patient was admitted for further investigation management.      Lung cancer  Left atrial thrombosis  Patient was diagnosed with lung cancer 2 weeks ago, had not been biopsied yet, was planned for biopsy later this week, presented to emergency with shortness of breath, was found to have invasion of the tumor into the vessels as well as the atrium. Patient was seen by cardiothoracic surgery, no plan for surgical intervention at the time being, case discussed with pulmonology. Underwent biopsy of axillary lymph node 8/19. Follow-up with oncology as outpatient. 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, CTA shows left ICA aneurysm  Neurosurgery consulted, to follow as outpatient    Dysphagia  Seen by gastroenterology, no plan for differential time being     Shortness of breath  Likely secondary to malignancy     CAD  Continue home medications     CHF  EF 30 to 35%, not in exacerbation     Hypertension  Home medication     Hyperlipidemia  New medications     Goals of care  see separate note, code status changes to DNR CCA             Physical Exam Performed:     BP (!) 105/54   Pulse 85   Temp 98.4 °F (36.9 °C) (Oral)   Resp 21   Ht 5' 2\" (1.575 m)   Wt 134 lb 4.2 oz (60.9 kg)   SpO2 91%   BMI 24.56 kg/m²       General appearance:  No apparent distress, appears stated age and cooperative. HEENT:  Normal cephalic, atraumatic without obvious deformity. Pupils equal, round, and reactive to light. Extra ocular muscles intact. Conjunctivae/corneas clear. Neck: Supple, with full range of motion. No jugular venous distention. Trachea midline. Respiratory:  Normal respiratory effort. Clear to auscultation, bilaterally without Rales/Wheezes/Rhonchi. Cardiovascular:  Regular rate and rhythm with normal S1/S2 without murmurs, rubs or gallops. Abdomen: Soft, non-tender, non-distended with normal bowel sounds. Musculoskeletal:  No clubbing, cyanosis or edema bilaterally. Full range of motion without deformity. Skin: Skin color, texture, turgor normal.  No rashes or lesions. Neurologic:  Neurovascularly intact without any focal sensory/motor deficits.  Cranial nerves: II-XII intact, grossly non-focal.  Psychiatric:  Alert and oriented, thought content appropriate, normal insight  Capillary Refill: Brisk,< 3 seconds   Peripheral Pulses: +2 palpable, equal bilaterally       Labs: For convenience and continuity at follow-up the following most recent labs are provided:      CBC:    Lab Results   Component Value Date/Time    WBC 7.3 08/19/2022 04:39 AM    HGB 13.3 08/19/2022 04:39 AM    HCT 39.2 08/19/2022 04:39 AM     08/19/2022 04:39 AM       Renal:    Lab Results   Component Value Date/Time     08/19/2022 04:39 AM    K 4.4 08/19/2022 04:39 AM     08/19/2022 04:39 AM    CO2 21 08/19/2022 04:39 AM    BUN 13 08/19/2022 04:39 AM    CREATININE 1.0 08/19/2022 04:39 AM    CALCIUM 8.8 08/19/2022 04:39 AM         Significant Diagnostic Studies    Radiology:   CTA HEAD W CONTRAST   Final Result   Lobulated left supraclinoid ICA fusiform aneurysm, superiorly and slightly   medially directed measuring 11 by 8 mm. 2 mm, inferiorly directed infundibulum versus aneurysm involving the right   supraclinoid ICA. CT HEAD WO CONTRAST   Final Result   No acute intracranial abnormality. Mild atrophy and small-vessel ischemic change      Mild prominence of the left internal carotid artery is seen, either due to   tortuous vessel or small aneurysm. Appearance appears similar compared to   prior head CT 2015         MRI BRAIN W WO CONTRAST   Final Result   No brain metastases visualized. Incidentally noted abnormality at the central skull base, worrisome for an   aneurysm. Recommend CTA for further evaluation. RECOMMENDATIONS:   Unavailable         US BIOPSY LYMPH NODE   Final Result   Successful ULTRASOUND guided core biopsy of right axillary lymphadenopathy. CT CHEST PULMONARY EMBOLISM W CONTRAST   Final Result   1. No evidence of acute pulmonary embolism. 2. Large right hilar mass which encases pulmonary arteries and veins and   obliterates the right upper lobe pulmonary vein.   There is invasion into the   left atrium with tumor thrombus seen in the right aspect of the left atrium. Extensive mediastinal lymphadenopathy. 3. Postobstructive changes in the right upper lobe. Multiple pulmonary   nodules with the most prominent in the right upper lobe measuring up to 1.9   cm and the superior segment of the right lower lobe measuring 1.5 cm. 4. Multiple subcutaneous nodules also likely represent metastatic disease. XR CHEST PORTABLE   Final Result   1. Similar appearance of mediastinal lymphadenopathy. 2.  Similar appearance of pulmonary nodule in right upper lobe with   surrounding ground-glass attenuation. Consults:     IP CONSULT TO GI  IP CONSULT TO CARDIOTHORACIC SURGERY  IP CONSULT TO PULMONOLOGY  IP CONSULT TO CARDIOLOGY  IP CONSULT TO ONCOLOGY  IP CONSULT TO GI  PHARMACY TO DOSE MEDICATION  IP CONSULT TO SPIRITUAL SERVICES  IP CONSULT TO HOME CARE NEEDS  IP CONSULT TO NEUROSURGERY    Disposition:  home      Condition at Discharge: Stable    Discharge Instructions/Follow-up:    -Oncology referral bone biopsy results, patient to follow with Dr. Luci Burt  -Follow-up with neurosurgery  -Continue home medications  -Zofran prescribed for nausea    Code Status:  DNR-CCA     Activity: activity as tolerated    Diet: regular diet      Discharge Medications:     Current Discharge Medication List             Details   ondansetron (ZOFRAN-ODT) 4 MG disintegrating tablet Take 1 tablet by mouth 3 times daily as needed for Nausea or Vomiting  Qty: 21 tablet, Refills: 0                Details   famotidine (PEPCID) 20 MG tablet Take 20 mg by mouth in the morning. sertraline (ZOLOFT) 100 MG tablet TAKE ONE TABLET BY MOUTH DAILY.   Qty: 30 tablet, Refills: 1    Associated Diagnoses: Major depressive disorder with single episode, in full remission (HCC)      carvedilol (COREG) 3.125 MG tablet TAKE ONE TABLET BY MOUTH TWICE A DAY WITH MEALS  Qty: 60 tablet, Refills: 3    Associated Diagnoses: Essential hypertension; Chronic systolic congestive heart failure (HCC)      aspirin EC 81 MG EC tablet Take 81 mg by mouth daily. acetaminophen (TYLENOL) 650 MG extended release tablet Take 650 mg by mouth every 8 hours as needed. Time Spent on discharge is more than 30 minutes in the examination, evaluation, counseling and review of medications and discharge plan. Signed:    Paulino Felton MD   8/19/2022      Thank you Robert Singletary MD for the opportunity to be involved in this patient's care. If you have any questions or concerns, please feel free to contact me at [phone].
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
## 2022-08-19 NOTE — PLAN OF CARE
Problem: Discharge Planning  Goal: Discharge to home or other facility with appropriate resources  8/19/2022 0034 by Lai Goldman RN  Outcome: Progressing  Flowsheets (Taken 8/18/2022 2045)  Discharge to home or other facility with appropriate resources: Identify barriers to discharge with patient and caregiver  8/18/2022 1418 by Thanh Villanueva RN  Outcome: Progressing     Problem: Pain  Goal: Verbalizes/displays adequate comfort level or baseline comfort level  8/19/2022 0034 by Lai Goldman RN  Outcome: Progressing  8/18/2022 1418 by Thanh Villanueva RN  Outcome: Progressing     Problem: Skin/Tissue Integrity  Goal: Absence of new skin breakdown  Description: 1. Monitor for areas of redness and/or skin breakdown  2. Assess vascular access sites hourly  3. Every 4-6 hours minimum:  Change oxygen saturation probe site  4. Every 4-6 hours:  If on nasal continuous positive airway pressure, respiratory therapy assess nares and determine need for appliance change or resting period.   8/19/2022 0034 by Lai Goldman RN  Outcome: Progressing  8/18/2022 1418 by Thanh Villanueva RN  Outcome: Progressing     Problem: ABCDS Injury Assessment  Goal: Absence of physical injury  8/19/2022 0034 by Lia Goldman RN  Outcome: Progressing  8/18/2022 1418 by Thanh Villanueva RN  Outcome: Progressing  Flowsheets  Taken 8/18/2022 1416 by Thanh Villanueva RN  Absence of Physical Injury: Implement safety measures based on patient assessment  Taken 8/18/2022 0239 by Blair Mancera RN  Absence of Physical Injury: Implement safety measures based on patient assessment

## 2022-08-19 NOTE — PLAN OF CARE
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 No acute findings, no subarachnoid hemorrhage. No acute intervention or workup required for aneurysm. Recommend f/u with [facility] as outpatient after discharge.     Lorin Martínez MD

## 2022-08-19 NOTE — PROGRESS NOTES
Discharge orders acknowledged by RN . Discharge teaching completed with pt and family. AVS reviewed and all questions answered. Medication regimen reviewed and pt understands schedule. Follow up appointments also reviewed with pt and resources given for discharge. Pt was sent electronic to be filled and understands schedule. IV removed. Bedside monitor removed from pt. 60+ minutes of education completed. Required core measures completed. Pt vitals WDL. Pt discharged with all belongings to home with family. Pt transported off of unit via wheelchair. No complications.       Electronically signed by Harvinder Meier RN on 8/19/2022 at 3:35 PM

## 2022-08-22 ENCOUNTER — CARE COORDINATION (OUTPATIENT)
Dept: CASE MANAGEMENT | Age: 82
End: 2022-08-22

## 2022-08-23 ENCOUNTER — CARE COORDINATION (OUTPATIENT)
Dept: CASE MANAGEMENT | Age: 82
End: 2022-08-23

## 2022-08-23 NOTE — TELEPHONE ENCOUNTER
Attempted to schedule. Spoke to patient's friend Cathie Hampton who helps her, she is on HIPPA. She stated patient is not sure she is going to go ahead with the home care. Cathie Hampton will call back to let us know & to schedule an appointment.     FYI

## 2022-08-23 NOTE — CARE COORDINATION
[facility] Initial Follow Up Call    Call within 2 business days of discharge: Yes    Patient: Joan Richardson Patient : [id number]   MRN: [id number]  Reason for Admission: Mass of right lung  Discharge Date: 22 RARS: Readmission Risk Score: 10.2      Last Discharge [facility]       Date Complaint Diagnosis Description Type Department Provider    22 Shortness of Breath Mass of right lung . .. ED to Hosp-Admission (Discharged) (ADMITTED) Pavithra De Santiago MD; Minnie Melgoza. Ayesha Dickerson Spoke with: no one    Facility: [facility]    2nd and final attempt at [employer] discharge phone call. Unable to reach patient. Left message. Informed Bonifacio Cee this would be the final CTN outreach.       Follow Up  Future Appointments   Date Time Provider Stalin Palacios   10/3/2022  2:20 PM Bhaskar Merino MD [facility] RN BSN  Care Transition Nurse  [phone]

## 2023-01-12 ENCOUNTER — TELEPHONE (OUTPATIENT)
Dept: FAMILY MEDICINE CLINIC | Age: 83
End: 2023-01-12

## 2023-12-11 NOTE — TELEPHONE ENCOUNTER
----- Message from Karyle Dora sent at 8/2/2021  3:33 PM EDT -----  Subject: Refill Request    QUESTIONS  Name of Medication? carvedilol (COREG) 12.5 MG tablet  Patient-reported dosage and instructions? TAKE ONE TABLET BY MOUTH TWICE A   DAY WITH MEALS  How many days do you have left? 2  Preferred Pharmacy? [address]  Pharmacy phone number (if available)? [phone]  Additional Information for Provider? PT states that her PCP told her to   get this medication refilled as soon as possible. This is her heart   medication and is needed daily. Please refill soon. ---------------------------------------------------------------------------  --------------  Zo MITCHELL  What is the best way for the office to contact you? OK to leave message on   voicemail  Preferred Call Back Phone Number?  [phone] Patient with one or more new problems requiring additional work-up/treatment.